# Patient Record
Sex: MALE | Race: WHITE | NOT HISPANIC OR LATINO | Employment: OTHER | URBAN - METROPOLITAN AREA
[De-identification: names, ages, dates, MRNs, and addresses within clinical notes are randomized per-mention and may not be internally consistent; named-entity substitution may affect disease eponyms.]

---

## 2017-01-17 ENCOUNTER — OFFICE VISIT (OUTPATIENT)
Dept: FAMILY MEDICINE | Facility: CLINIC | Age: 67
End: 2017-01-17
Payer: COMMERCIAL

## 2017-01-17 VITALS
TEMPERATURE: 99.2 F | WEIGHT: 214.5 LBS | DIASTOLIC BLOOD PRESSURE: 68 MMHG | HEART RATE: 84 BPM | HEIGHT: 71 IN | SYSTOLIC BLOOD PRESSURE: 110 MMHG | OXYGEN SATURATION: 97 % | BODY MASS INDEX: 30.03 KG/M2

## 2017-01-17 DIAGNOSIS — J01.10 ACUTE NON-RECURRENT FRONTAL SINUSITIS: Primary | ICD-10-CM

## 2017-01-17 DIAGNOSIS — F17.200 TOBACCO USE DISORDER: ICD-10-CM

## 2017-01-17 DIAGNOSIS — Z12.11 SCREEN FOR COLON CANCER: ICD-10-CM

## 2017-01-17 DIAGNOSIS — Z23 ENCOUNTER FOR IMMUNIZATION: ICD-10-CM

## 2017-01-17 PROCEDURE — 99214 OFFICE O/P EST MOD 30 MIN: CPT | Performed by: FAMILY MEDICINE

## 2017-01-17 RX ORDER — AMOXICILLIN 875 MG
875 TABLET ORAL 2 TIMES DAILY
Qty: 20 TABLET | Refills: 0 | Status: SHIPPED | OUTPATIENT
Start: 2017-01-17 | End: 2017-05-26

## 2017-01-17 NOTE — PROGRESS NOTES
SUBJECTIVE:                                                    Louie Osorio is a 66 year old male who presents to clinic today for the following health issues:      Acute Illness   Acute illness concerns: sinus   Onset: couple weeks ago  But last Saturday got worse    Fever: YES    Chills/Sweats: YES    Headache (location?): YES- frontal    Sinus Pressure:YES- post-nasal drainage and facial pain    Conjunctivitis:  no    Ear Pain: no    Rhinorrhea: YES    Congestion: no    Sore Throat: YES     Cough: no    Wheeze: no    Decreased Appetite: no    Nausea: no    Vomiting: no    Diarrhea:  no    Dysuria/Freq.: no    Fatigue/Achiness: YES- achiness     Sick/Strep Exposure: YES- friends are sick      Therapies Tried and outcome: nasal spray and cough drops     Couple of weeks of cold and for last one week - worse.   Feverish feeling with some chills.   Pain above eyes. Pain changes with body movement.     Smoking - not more than a pack.     Problem list and histories reviewed & adjusted, as indicated.  Additional history: as documented    Problem list, Medication list, Allergies, and Medical/Social/Surgical histories reviewed in [facility] and updated as appropriate.      Social History     Social History     Marital Status: Single     Spouse Name: N/A     Number of Children: 0     Years of Education: N/A     Occupational History      -       [employer]     Social History Main Topics     Smoking status: Current Some Day Smoker -- 42 years     Types: Cigarettes     Smokeless tobacco: Never Used      Comment: Smoker - <1 ppd started at 17 years     Alcohol Use: Yes      Comment: 15 drinks a week      Drug Use: No     Sexual Activity:     Partners: Female     Birth Control/ Protection: Abstinence     Other Topics Concern      Service No     Blood Transfusions No     Caffeine Concern No     Occupational Exposure Yes     Hobby Hazards No     Sleep Concern Yes     can't skeep well at  "night     Stress Concern No     Weight Concern Yes     pt is not happy with weight     Special Diet No     Back Care No     Exercise Yes     sometimes     Bike Helmet Yes     pt does ride a bike w/helmet     Seat Belt Yes     Self-Exams No     Parent/Sibling W/ Cabg, Mi Or Angioplasty Before 65f 55m? No     Social History Narrative     No Known Allergies  Patient Active Problem List   Diagnosis     Benign familial tremor     Tobacco use disorder     Reviewed medications, social history and  past medical and surgical history.    Review of system: for general, respiratory, CVS, GI and psychiatry negative except for noted above.     EXAM:  /68 mmHg  Pulse 84  Temp(Src) 99.2  F (37.3  C) (Oral)  Ht 5' 11\" (1.803 m)  Wt 214 lb 8 oz (97.297 kg)  BMI 29.93 kg/m2  SpO2 97%  Constitutional: healthy, alert and no distress   Psychiatric: mentation appears normal and affect normal/bright  Cardiovascular: RRR. No murmurs,  Respiratory: negative, Lungs clear. No crackles or wheezing. No tachypnea.   ENT: right canal- mild wax. Left TM clear. . Throat mild erythema. Frontal and mild maxillary sinus tenderness.   Neuro - coarse tremors,     ASSESSMENT / PLAN:  (J01.10) Acute non-recurrent frontal sinusitis  (primary encounter diagnosis)  Comment: discussed home care. antibiotics and side effects.   Plan: amoxicillin (AMOXIL) 875 MG tablet              (F17.200) Tobacco use disorder  Comment:  Can worsen problem #1.  Plan: discussed to quit it completely. He is going to work on his own. Does not want any other intervention for now.     (Z12.11) Screen for colon cancer  Comment:    Plan: GASTROENTEROLOGY ADULT REFERRAL +/- PROCEDURE                  "

## 2017-01-17 NOTE — NURSING NOTE
"Chief Complaint   Patient presents with     Sinus Problem       Initial /68 mmHg  Pulse 84  Temp(Src) 99.2  F (37.3  C) (Oral)  Ht 5' 11\" (1.803 m)  Wt 214 lb 8 oz (97.297 kg)  BMI 29.93 kg/m2  SpO2 97% Estimated body mass index is 29.93 kg/(m^2) as calculated from the following:    Height as of this encounter: 5' 11\" (1.803 m).    Weight as of this encounter: 214 lb 8 oz (97.297 kg).  BP completed using cuff size: paramjit Solis CMA      "

## 2017-05-26 ENCOUNTER — OFFICE VISIT (OUTPATIENT)
Dept: FAMILY MEDICINE | Facility: CLINIC | Age: 67
End: 2017-05-26
Payer: COMMERCIAL

## 2017-05-26 VITALS
TEMPERATURE: 99.1 F | BODY MASS INDEX: 31.4 KG/M2 | HEART RATE: 108 BPM | HEIGHT: 71 IN | OXYGEN SATURATION: 95 % | DIASTOLIC BLOOD PRESSURE: 66 MMHG | WEIGHT: 224.25 LBS | SYSTOLIC BLOOD PRESSURE: 128 MMHG

## 2017-05-26 DIAGNOSIS — Z23 NEED FOR PNEUMOCOCCAL VACCINATION: ICD-10-CM

## 2017-05-26 DIAGNOSIS — R05.9 COUGH: Primary | ICD-10-CM

## 2017-05-26 DIAGNOSIS — F17.200 TOBACCO USE DISORDER: ICD-10-CM

## 2017-05-26 PROCEDURE — 90670 PCV13 VACCINE IM: CPT | Performed by: FAMILY MEDICINE

## 2017-05-26 PROCEDURE — 99213 OFFICE O/P EST LOW 20 MIN: CPT | Mod: 25 | Performed by: FAMILY MEDICINE

## 2017-05-26 PROCEDURE — G0009 ADMIN PNEUMOCOCCAL VACCINE: HCPCS | Performed by: FAMILY MEDICINE

## 2017-05-26 RX ORDER — CODEINE PHOSPHATE AND GUAIFENESIN 10; 100 MG/5ML; MG/5ML
1 SOLUTION ORAL EVERY 6 HOURS PRN
Qty: 120 ML | Refills: 0 | Status: SHIPPED | OUTPATIENT
Start: 2017-05-26 | End: 2017-08-03

## 2017-05-26 NOTE — MR AVS SNAPSHOT
"              After Visit Summary   2017    Louie Osorio    MRN: [id number]           Patient Information     Date Of Birth          1950        Visit Information        Provider Department      2017 8:00 AM Estuardo Wilson MD [facility]        Today's Diagnoses     Cough    -  1    Tobacco use disorder        Need for pneumococcal vaccination           Follow-ups after your visit        Who to contact     If you have questions or need follow up information about today's clinic visit or your schedule please contact [facility] directly at [phone].  Normal or non-critical lab and imaging results will be communicated to you by [employer], letter or phone within 4 business days after the clinic has received the results. If you do not hear from us within 7 days, please contact the clinic through [employer] or phone. If you have a critical or abnormal lab result, we will notify you by phone as soon as possible.  Submit refill requests through [employer] or call your pharmacy and they will forward the refill request to us. Please allow 3 business days for your refill to be completed.          Additional Information About Your Visit        MyChart Information     [employer] lets you send messages to your doctor, view your test results, renew your prescriptions, schedule appointments and more. To sign up, go to [email or web address] . Click on \"Log in\" on the left side of the screen, which will take you to the Welcome page. Then click on \"Sign up Now\" on the right side of the page.     You will be asked to enter the access code listed below, as well as some personal information. Please follow the directions to create your username and password.     Your access code is: [email or web address]  Expires: 2017  9:28 AM     Your access code will  in 90 days. If you need help or a new code, please call your [facility] or [phone].        Care EveryWhere ID     This " "is your Care EveryWhere ID. This could be used by other organizations to access your [facility] medical records  [id number]        Your Vitals Were     Pulse Temperature Height Pulse Oximetry BMI (Body Mass Index)       108 99.1  F (37.3  C) (Oral) 5' 11\" (1.803 m) 95% 31.28 kg/m2        Blood Pressure from Last 3 Encounters:   05/26/17 128/66   01/17/17 110/68   05/09/16 124/86    Weight from Last 3 Encounters:   05/26/17 224 lb 4 oz (101.7 kg)   01/17/17 214 lb 8 oz (97.3 kg)   05/09/16 213 lb (96.6 kg)              We Performed the Following     ADMIN: Vaccine, Initial (34862)     Pneumococcal vaccine 13 valent PCV13 IM (Prevnar) [02202]          Today's Medication Changes          These changes are accurate as of: 5/26/17 11:59 PM.  If you have any questions, ask your nurse or doctor.               Start taking these medicines.        Dose/Directions    guaiFENesin-codeine 100-10 MG/5ML Soln solution   Commonly known as:  ROBITUSSIN AC   Used for:  Cough, Tobacco use disorder   Started by:  Estuardo Wilson MD        Dose:  1 tsp.   Take 5 mLs by mouth every 6 hours as needed   Quantity:  120 mL   Refills:  0            Where to get your medicines      Some of these will need a paper prescription and others can be bought over the counter.  Ask your nurse if you have questions.     Bring a paper prescription for each of these medications     guaiFENesin-codeine 100-10 MG/5ML Soln solution                Primary Care Provider Office Phone # Fax #    Estuardo Wilson [phone]       [address] [address]        Thank you!     Thank you for choosing [facility]  for your care. Our goal is always to provide you with excellent care. Hearing back from our patients is one way we can continue to improve our services. Please take a few minutes to complete the written survey that you may receive in the mail after your visit with " us. Thank you!             Your Updated Medication List - Protect others around you: Learn how to safely use, store and throw away your medicines at www.disposemymeds.org.          This list is accurate as of: 5/26/17 11:59 PM.  Always use your most recent med list.                   Brand Name Dispense Instructions for use    EQL FIBER SUPPLEMENT PO      Take 3 capsules by mouth daily as needed Psyllium, collinsonia powder, apple pectin, fennel, and fenugreek.  Uses once a week at the most for constipation.       fish oil-omega-3 fatty acids 1000 MG capsule      Take 2 g by mouth daily       fluticasone 50 MCG/ACT spray    FLONASE    16 g    Spray 2 sprays into both nostrils daily       Glucosamine Sulfate 750 MG Caps      Take 1 capsule by mouth daily       guaiFENesin-codeine 100-10 MG/5ML Soln solution    ROBITUSSIN AC    120 mL    Take 5 mLs by mouth every 6 hours as needed       * HERBALS      Take 1 each by mouth daily as needed (cold) Minus sinus includes - spearmint leafe, nettle leaf, mullein leaf, elderflowers, calendula, thyme, stevia. Only uses when he has a cold.       * HERBALS      Take 1 each by mouth daily as needed (doesnt drink every day) Sweet and spicy tea: rooibos, chicory root, rosehip, cinnamon, lemon grass, peppermint, chamomile, ginger root, anise seed, orange oil.       magnesium 250 MG tablet      Take 1 tablet by mouth daily       propranolol 20 MG tablet    INDERAL    180 tablet    Take 1 tablet (20 mg) by mouth 3 times daily as needed       T-RELIEF PAIN ARNICA + 12 Thpk      1 Application by * route daily as needed (back pain and pain in general) Includes aconitum, arnica, belladonna, bellis perennis, calendula, officinalis, chamomilla, echinacea, hamamelis, hypericum, millefolium, wesley graveolens, symphytum officinale       Vitamin D (Cholecalciferol) 1000 UNITS Tabs      Take 1,000 Units by mouth daily       * Notice:  This list has 2 medication(s) that are the same as other  medications prescribed for you. Read the directions carefully, and ask your doctor or other care provider to review them with you.

## 2017-05-26 NOTE — NURSING NOTE
"Chief Complaint   Patient presents with     URI       Initial /66 (Cuff Size: Adult Large)  Pulse 108  Temp 99.1  F (37.3  C) (Oral)  Ht 5' 11\" (1.803 m)  Wt 224 lb 4 oz (101.7 kg)  SpO2 95%  BMI 31.28 kg/m2 Estimated body mass index is 31.28 kg/(m^2) as calculated from the following:    Height as of this encounter: 5' 11\" (1.803 m).    Weight as of this encounter: 224 lb 4 oz (101.7 kg).  Medication Reconciliation: complete     Berna Solis, FLORENTINO      "

## 2017-05-26 NOTE — NURSING NOTE
Screening Questionnaire for Adult Immunization    Are you sick today?   No   Do you have allergies to medications, food, a vaccine component or latex?   No   Have you ever had a serious reaction after receiving a vaccination?   No   Do you have a long-term health problem with heart disease, lung disease, asthma, kidney disease, metabolic disease (e.g. diabetes), anemia, or other blood disorder?   No   Do you have cancer, leukemia, HIV/AIDS, or any other immune system problem?   No   In the past 3 months, have you taken medications that affect  your immune system, such as prednisone, other steroids, or anticancer drugs; drugs for the treatment of rheumatoid arthritis, Crohn s disease, or psoriasis; or have you had radiation treatments?   No   Have you had a seizure, or a brain or other nervous system problem?   No   During the past year, have you received a transfusion of blood or blood     products, or been given immune (gamma) globulin or antiviral drug?   No   For women: Are you pregnant or is there a chance you could become        pregnant during the next month?   No   Have you received any vaccinations in the past 4 weeks?   No     Immunization questionnaire answers were all negative.      MNVFC doesn't apply on this patient    Per orders of Dr. Wilson, injection of PCV 13 given by Berna Solis. Patient instructed to remain in clinic for 20 minutes afterwards, and to report any adverse reaction to me immediately.       Screening performed by Berna Solis on 5/26/2017 at 8:37 AM.    Prior to injection verified patient identity using patient's name and date of birth.

## 2017-05-26 NOTE — PROGRESS NOTES
SUBJECTIVE:                                                    Louie Osorio is a 66 year old male who presents to clinic today for the following health issues:    Acute Illness   Acute illness concerns: cold and cough  Onset: 3 days     Fever: no    Chills/Sweats: no    Headache (location?): YES- frontal     Sinus Pressure:YES- post-nasal drainage and facial pain    Conjunctivitis:  no    Ear Pain: no    Rhinorrhea: YES    Congestion: YES    Sore Throat: YES     Cough: YES-productive of green sputum    Wheeze: no    Decreased Appetite: no    Nausea: no    Vomiting: no    Diarrhea:  no    Dysuria/Freq.: no    Fatigue/Achiness: no    Sick/Strep Exposure: no     Therapies Tried and outcome: cough drops     Still smoking but cut down - little less than 1 pack per day.     Current symptoms started Wednesday night.     Coughing and throat pain are worst right now.     Problem list and histories reviewed & adjusted, as indicated.  Additional history: as documented    Labs reviewed in EPIC    Reviewed and updated as needed this visit by clinical staff    Reviewed and updated as needed this visit by Provider    Social History     Social History     Marital status: Single     Spouse name: N/A     Number of children: 0     Years of education: N/A     Occupational History      -  [employer]     Social History Main Topics     Smoking status: Current Some Day Smoker     Years: 42.00     Types: Cigarettes     Smokeless tobacco: Never Used      Comment: Smoker - <1 ppd started at 17 years     Alcohol use Yes      Comment: 15 drinks a week      Drug use: No     Sexual activity: Yes     Partners: Female     Birth control/ protection: Abstinence     Other Topics Concern      Service No     Blood Transfusions No     Caffeine Concern No     Occupational Exposure Yes     Hobby Hazards No     Sleep Concern Yes     can't skeep well at night     Stress Concern No      "Weight Concern Yes     pt is not happy with weight     Special Diet No     Back Care No     Exercise Yes     sometimes     Bike Helmet Yes     pt does ride a bike w/helmet     Seat Belt Yes     Self-Exams No     Parent/Sibling W/ Cabg, Mi Or Angioplasty Before 65f 55m? No     Social History Narrative     No Known Allergies  Patient Active Problem List   Diagnosis     Benign familial tremor     Tobacco use disorder     Reviewed medications, social history and  past medical and surgical history.    Review of system: for general, respiratory, CVS, GI and psychiatry negative except for noted above.     EXAM:  /66 (Cuff Size: Adult Large)  Pulse 108  Temp 99.1  F (37.3  C) (Oral)  Ht 5' 11\" (1.803 m)  Wt 224 lb 4 oz (101.7 kg)  SpO2 95%  BMI 31.28 kg/m2  Constitutional: healthy, alert and no distress   Psychiatric: mentation appears normal and affect normal/bright  Cardiovascular: RRR. No murmurs,  Respiratory: negative, Lungs clear. No crackles or wheezing. No tachypnea.   ENT: Both TM exam normal, no neck nodes or sinus tenderness, mild nasal turbinate hypertrophy, throat clear    ASSESSMENT / PLAN:  (R05) Cough  (primary encounter diagnosis)  Comment: recent symptoms. Most likely viral. cheratussin AC for cough suppression. We talked about short and long term side effects, dependency, abuse etc with current medication. Understands controlled substance and we do not refill controlled substance without seen, over phone or via email/mychart. If symptoms persist for another week, may consider antibiotics.   Plan: guaiFENesin-codeine (ROBITUSSIN AC) 100-10         MG/5ML SOLN solution    (F17.200) Tobacco use disorder  Comment:  Ongoing. Most likely contributing factor for above. Copd can not be ruled out. Strongly recommend him to quit smoking.   Plan: guaiFENesin-codeine (ROBITUSSIN AC) 100-10         MG/5ML SOLN solution    (Z23) Need for pneumococcal vaccination  Comment:    Plan: Pneumococcal vaccine 13 " valent PCV13 IM         (Prevnar) [03918], ADMIN: Vaccine, Initial         (94239)

## 2017-05-31 NOTE — PROGRESS NOTES
SUBJECTIVE:                                                    Louie Osorio is a 66 year old male who presents to clinic today for the following health issues:      RESPIRATORY SYMPTOMS      Duration: few weeks    Description  nasal congestion, productive cough and post-nasal drip    Severity: moderate    Accompanying signs and symptoms: None    History (predisposing factors):  Seen here last week, current smoker    Precipitating or alleviating factors: None    Therapies tried and outcome:  Codeine cough syrup, fluticasone nasal spray, Amoxicillin somewhat helpful       Evaluated for cough and URI symptoms x 3 days 5/26/17.  Symptoms felt to be viral, pt advised on symptomatic cares.  Hx smoking, possible underlying COPD noted on last visit.      Patient reports ongoing cough and postnasal drainage, rhinitis.  No fever.  No wheezing or shortness of breath.  Cough is productive, green.  Last week had some facial pressure which has resolved.  Does have some mild congestion.    Does have history of mild seasonal allergies.  Does not take allergy medication.    Smoking off and on for 50 years, currently smoking 1ppd, since last Thursday has only smoked 1 pack.  Tends to get cold once a year, denies history of recurrent episodes of cough, no history of chronic daily cough.  Quit smoking cold turkey for 10 years.    Patient Active Problem List   Diagnosis     Benign familial tremor     Tobacco use disorder     Past Surgical History:   Procedure Laterality Date     C ORAL SURGERY PROCEDURE  1967    [place] Teeth Extraction     C REMOVAL GALLBLADDER  1995       Social History   Substance Use Topics     Smoking status: Current Some Day Smoker     Years: 42.00     Types: Cigarettes     Smokeless tobacco: Never Used      Comment: Smoker - <1 ppd started at 17 years     Alcohol use Yes      Comment: 15 drinks a week      Family History   Problem Relation Age of Onset     Thyroid Disease Mother          Current Outpatient  Prescriptions   Medication Sig Dispense Refill     azithromycin (ZITHROMAX) 250 MG tablet Two tablets first day, then one tablet daily for four days. 6 tablet 0     fluticasone (FLONASE) 50 MCG/ACT spray Spray 1-2 sprays into both nostrils daily 1 Bottle 0     Vitamin D, Cholecalciferol, 1000 UNITS TABS Take 1,000 Units by mouth daily       fish oil-omega-3 fatty acids 1000 MG capsule Take 2 g by mouth daily       magnesium 250 MG tablet Take 1 tablet by mouth daily       Glucosamine Sulfate 750 MG CAPS Take 1 capsule by mouth daily       Corn Dextrin (EQL FIBER SUPPLEMENT PO) Take 3 capsules by mouth daily as needed Psyllium, collinsonia powder, apple pectin, fennel, and fenugreek.  Uses once a week at the most for constipation.       HERBALS Take 1 each by mouth daily as needed (cold) Minus sinus includes - spearmint leafe, nettle leaf, mullein leaf, elderflowers, calendula, thyme, stevia. Only uses when he has a cold.       HERBALS Take 1 each by mouth daily as needed (doesnt drink every day) Sweet and spicy tea: rooibos, chicory root, rosehip, cinnamon, lemon grass, peppermint, chamomile, ginger root, anise seed, orange oil.       Homeopathic Products (T-RELIEF PAIN ARNICA + 12) THPK 1 Application by * route daily as needed (back pain and pain in general) Includes aconitum, arnica, belladonna, bellis perennis, calendula, officinalis, chamomilla, echinacea, hamamelis, hypericum, millefolium, wesley graveolens, symphytum officinale       guaiFENesin-codeine (ROBITUSSIN AC) 100-10 MG/5ML SOLN solution Take 5 mLs by mouth every 6 hours as needed (Patient not taking: Reported on 6/1/2017) 120 mL 0     fluticasone (FLONASE) 50 MCG/ACT nasal spray Spray 2 sprays into both nostrils daily (Patient not taking: Reported on 5/26/2017) 16 g 0     propranolol (INDERAL) 20 MG tablet Take 1 tablet (20 mg) by mouth 3 times daily as needed (Patient not taking: Reported on 5/26/2017) 180 tablet 1       ROS:  Const, HEENT, Resp,  Psych as above, otherwise negative       OBJECTIVE:                                                    /84 (BP Location: Left arm, Patient Position: Chair, Cuff Size: Adult Large)  Pulse 86  Temp 98.9  F (37.2  C) (Tympanic)  Resp 16  Wt 225 lb (102.1 kg)  SpO2 98%  BMI 31.38 kg/m2   GENERAL APPEARANCE: healthy, alert and no distress  EYES: Eyes grossly normal to inspection and conjunctivae and sclerae normal  HENT: ear canals and TM's normal and nose and mouth without ulcers or lesions  NECK: no adenopathy  RESP: lungs clear to auscultation - no rales, rhonchi or wheezes  CV: regular rates and rhythm, normal S1 S2, no S3 or S4 and no murmur, click or rub  PSYCH: mentation appears normal and affect normal/bright        ASSESSMENT/PLAN:                                                    (R05) Cough  (primary encounter diagnosis)  Comment: viral v. Allergies v. COPD.  Stable O2 sats, afebrile, lungs CTA, doubt pneumonia.  PND seems to be contributing significantly   Plan: azithromycin (ZITHROMAX) 250 MG tablet        Trial of 1 week antihistamine and steroid nasal spray to dry up PND.  If not improving in 1 week feel rx for zpack.  Follow up if still not improving after abx.    (F17.200) Tobacco use disorder  Plan: urged cessation    (R09.82) Post-nasal drainage  Plan: fluticasone (FLONASE) 50 MCG/ACT spray        As above         See Patient Instructions    Kayla Espana, [facility]    Patient Instructions   1.  I think cough might be triggered by drainage down throat/allergies.  Start over the counter antihistamine once a day like allegra, claritin, or zytec.  Start flonase steroid nasal spray once a day.  If not improving in 1 week start antibiotics azithromycin, 2 pills on the first day then 1 pill per day for 4 more days.    2.  Work on quitting smoking, this is the best thing you can do for your health

## 2017-06-01 ENCOUNTER — OFFICE VISIT (OUTPATIENT)
Dept: FAMILY MEDICINE | Facility: CLINIC | Age: 67
End: 2017-06-01
Payer: COMMERCIAL

## 2017-06-01 VITALS
OXYGEN SATURATION: 98 % | RESPIRATION RATE: 16 BRPM | HEART RATE: 86 BPM | WEIGHT: 225 LBS | SYSTOLIC BLOOD PRESSURE: 128 MMHG | TEMPERATURE: 98.9 F | BODY MASS INDEX: 31.38 KG/M2 | DIASTOLIC BLOOD PRESSURE: 84 MMHG

## 2017-06-01 DIAGNOSIS — F17.200 TOBACCO USE DISORDER: ICD-10-CM

## 2017-06-01 DIAGNOSIS — R05.9 COUGH: Primary | ICD-10-CM

## 2017-06-01 DIAGNOSIS — R09.82 POST-NASAL DRAINAGE: ICD-10-CM

## 2017-06-01 PROCEDURE — 99213 OFFICE O/P EST LOW 20 MIN: CPT | Performed by: NURSE PRACTITIONER

## 2017-06-01 RX ORDER — AZITHROMYCIN 250 MG/1
TABLET, FILM COATED ORAL
Qty: 6 TABLET | Refills: 0 | Status: SHIPPED | OUTPATIENT
Start: 2017-06-01 | End: 2017-08-03

## 2017-06-01 RX ORDER — FLUTICASONE PROPIONATE 50 MCG
1-2 SPRAY, SUSPENSION (ML) NASAL DAILY
Qty: 1 BOTTLE | Refills: 0 | Status: SHIPPED | OUTPATIENT
Start: 2017-06-01

## 2017-06-01 NOTE — NURSING NOTE
"Chief Complaint   Patient presents with     Cough       Initial /84 (BP Location: Left arm, Patient Position: Chair, Cuff Size: Adult Large)  Pulse 86  Temp 98.9  F (37.2  C) (Tympanic)  Resp 16  Wt 225 lb (102.1 kg)  SpO2 98%  BMI 31.38 kg/m2 Estimated body mass index is 31.38 kg/(m^2) as calculated from the following:    Height as of 5/26/17: 5' 11\" (1.803 m).    Weight as of this encounter: 225 lb (102.1 kg).  Medication Reconciliation: complete     Danae Quiroz, CMA    "

## 2017-06-01 NOTE — PATIENT INSTRUCTIONS
1.  I think cough might be triggered by drainage down throat/allergies.  Start over the counter antihistamine once a day like allegra, claritin, or zytec.  Start flonase steroid nasal spray once a day.  If not improving in 1 week start antibiotics azithromycin, 2 pills on the first day then 1 pill per day for 4 more days.    2.  Work on quitting smoking, this is the best thing you can do for your health

## 2017-06-01 NOTE — MR AVS SNAPSHOT
"              After Visit Summary   6/1/2017    Louie Osorio    MRN: [id number]           Patient Information     Date Of Birth          1950        Visit Information        Provider Department      6/1/2017 12:00 PM Kayla Espana APRN CNP [facility]        Today's Diagnoses     Cough    -  1    Tobacco use disorder        Post-nasal drainage          Care Instructions    1.  I think cough might be triggered by drainage down throat/allergies.  Start over the counter antihistamine once a day like allegra, claritin, or zytec.  Start flonase steroid nasal spray once a day.  If not improving in 1 week start antibiotics azithromycin, 2 pills on the first day then 1 pill per day for 4 more days.    2.  Work on quitting smoking, this is the best thing you can do for your health          Follow-ups after your visit        Who to contact     If you have questions or need follow up information about today's clinic visit or your schedule please contact [facility] directly at [phone].  Normal or non-critical lab and imaging results will be communicated to you by Penumbrahart, letter or phone within 4 business days after the clinic has received the results. If you do not hear from us within 7 days, please contact the clinic through Mob Sciencet or phone. If you have a critical or abnormal lab result, we will notify you by phone as soon as possible.  Submit refill requests through Dashbid or call your pharmacy and they will forward the refill request to us. Please allow 3 business days for your refill to be completed.          Additional Information About Your Visit        PenumbraharJoMaJa Information     Dashbid lets you send messages to your doctor, view your test results, renew your prescriptions, schedule appointments and more. To sign up, go to [email or web address] . Click on \"Log in\" on the left side of the screen, which will take you to the Welcome page. Then click on \"Sign up Now\" " on the right side of the page.     You will be asked to enter the access code listed below, as well as some personal information. Please follow the directions to create your username and password.     Your access code is: [email or web address]  Expires: 2017  9:28 AM     Your access code will  in 90 days. If you need help or a new code, please call your [place] clinic or [phone].        Care EveryWhere ID     This is your Care EveryWhere ID. This could be used by other organizations to access your [place] medical records  [id number]        Your Vitals Were     Pulse Temperature Respirations Pulse Oximetry BMI (Body Mass Index)       86 98.9  F (37.2  C) (Tympanic) 16 98% 31.38 kg/m2        Blood Pressure from Last 3 Encounters:   17 128/84   17 128/66   17 110/68    Weight from Last 3 Encounters:   17 225 lb (102.1 kg)   17 224 lb 4 oz (101.7 kg)   17 214 lb 8 oz (97.3 kg)              Today, you had the following     No orders found for display         Today's Medication Changes          These changes are accurate as of: 17 12:19 PM.  If you have any questions, ask your nurse or doctor.               Start taking these medicines.        Dose/Directions    azithromycin 250 MG tablet   Commonly known as:  ZITHROMAX   Used for:  Cough   Started by:  Kayla Espana APRN CNP        Two tablets first day, then one tablet daily for four days.   Quantity:  6 tablet   Refills:  0         These medicines have changed or have updated prescriptions.        Dose/Directions    * fluticasone 50 MCG/ACT spray   Commonly known as:  FLONASE   This may have changed:  Another medication with the same name was added. Make sure you understand how and when to take each.   Used for:  Acute sinusitis, recurrence not specified, unspecified location   Changed by:  Jacquie Pizarro MD        Dose:  2 spray   Spray 2 sprays into both nostrils daily   Quantity:  16 g   Refills:  0        * fluticasone 50 MCG/ACT spray   Commonly known as:  FLONASE   This may have changed:  You were already taking a medication with the same name, and this prescription was added. Make sure you understand how and when to take each.   Used for:  Post-nasal drainage   Changed by:  Kayla Espana APRN CNP        Dose:  1-2 spray   Spray 1-2 sprays into both nostrils daily   Quantity:  1 Bottle   Refills:  0       * Notice:  This list has 2 medication(s) that are the same as other medications prescribed for you. Read the directions carefully, and ask your doctor or other care provider to review them with you.         Where to get your medicines      These medications were sent to [facility] [place] [address] [place] [id number]     Phone:  [phone]     fluticasone 50 MCG/ACT spray         Some of these will need a paper prescription and others can be bought over the counter.  Ask your nurse if you have questions.     Bring a paper prescription for each of these medications     azithromycin 250 MG tablet                Primary Care Provider Office Phone # Fax #    Estuardo Inder Wilson [phone]       [address] [address]        Thank you!     Thank you for choosing [facility]  for your care. Our goal is always to provide you with excellent care. Hearing back from our patients is one way we can continue to improve our services. Please take a few minutes to complete the written survey that you may receive in the mail after your visit with us. Thank you!             Your Updated Medication List - Protect others around you: Learn how to safely use, store and throw away your medicines at www.disposemymeds.org.          This list is accurate as of: 6/1/17 12:19 PM.  Always use your most recent med list.                   Brand Name Dispense Instructions for use    azithromycin  250 MG tablet    ZITHROMAX    6 tablet    Two tablets first day, then one tablet daily for four days.       EQL FIBER SUPPLEMENT PO      Take 3 capsules by mouth daily as needed Psyllium, collinsonia powder, apple pectin, fennel, and fenugreek.  Uses once a week at the most for constipation.       fish oil-omega-3 fatty acids 1000 MG capsule      Take 2 g by mouth daily       * fluticasone 50 MCG/ACT spray    FLONASE    16 g    Spray 2 sprays into both nostrils daily       * fluticasone 50 MCG/ACT spray    FLONASE    1 Bottle    Spray 1-2 sprays into both nostrils daily       Glucosamine Sulfate 750 MG Caps      Take 1 capsule by mouth daily       guaiFENesin-codeine 100-10 MG/5ML Soln solution    ROBITUSSIN AC    120 mL    Take 5 mLs by mouth every 6 hours as needed       * HERBALS      Take 1 each by mouth daily as needed (cold) Minus sinus includes - spearmint leafe, nettle leaf, mullein leaf, elderflowers, calendula, thyme, stevia. Only uses when he has a cold.       * HERBALS      Take 1 each by mouth daily as needed (doesnt drink every day) Sweet and spicy tea: rooibos, chicory root, rosehip, cinnamon, lemon grass, peppermint, chamomile, ginger root, anise seed, orange oil.       magnesium 250 MG tablet      Take 1 tablet by mouth daily       propranolol 20 MG tablet    INDERAL    180 tablet    Take 1 tablet (20 mg) by mouth 3 times daily as needed       T-RELIEF PAIN ARNICA + 12 Thpk      1 Application by * route daily as needed (back pain and pain in general) Includes aconitum, arnica, belladonna, bellis perennis, calendula, officinalis, chamomilla, echinacea, hamamelis, hypericum, millefolium, wesley graveolens, symphytum officinale       Vitamin D (Cholecalciferol) 1000 UNITS Tabs      Take 1,000 Units by mouth daily       * Notice:  This list has 4 medication(s) that are the same as other medications prescribed for you. Read the directions carefully, and ask your doctor or other care provider to review them  with you.

## 2017-07-21 ENCOUNTER — DOCUMENTATION ONLY (OUTPATIENT)
Dept: VASCULAR SURGERY | Facility: CLINIC | Age: 67
End: 2017-07-21

## 2017-07-21 DIAGNOSIS — Z13.6 ENCOUNTER FOR ABDOMINAL AORTIC ANEURYSM (AAA) SCREENING: Primary | ICD-10-CM

## 2017-07-21 DIAGNOSIS — Z72.0 CURRENT TOBACCO USE: Primary | ICD-10-CM

## 2017-08-03 ENCOUNTER — OFFICE VISIT (OUTPATIENT)
Dept: FAMILY MEDICINE | Facility: CLINIC | Age: 67
End: 2017-08-03
Payer: COMMERCIAL

## 2017-08-03 VITALS
SYSTOLIC BLOOD PRESSURE: 136 MMHG | TEMPERATURE: 97.9 F | WEIGHT: 224 LBS | BODY MASS INDEX: 31.24 KG/M2 | RESPIRATION RATE: 18 BRPM | HEART RATE: 93 BPM | DIASTOLIC BLOOD PRESSURE: 89 MMHG | OXYGEN SATURATION: 95 %

## 2017-08-03 DIAGNOSIS — H00.011 HORDEOLUM EXTERNUM OF RIGHT UPPER EYELID: Primary | ICD-10-CM

## 2017-08-03 PROCEDURE — 99213 OFFICE O/P EST LOW 20 MIN: CPT | Performed by: FAMILY MEDICINE

## 2017-08-03 RX ORDER — POLYMYXIN B SULFATE AND TRIMETHOPRIM 1; 10000 MG/ML; [USP'U]/ML
1 SOLUTION OPHTHALMIC
Qty: 1 BOTTLE | Refills: 0 | Status: SHIPPED | OUTPATIENT
Start: 2017-08-03 | End: 2017-08-10

## 2017-08-03 NOTE — MR AVS SNAPSHOT
After Visit Summary   8/3/2017    Louie Osorio    MRN: [id number]           Patient Information     Date Of Birth          1950        Visit Information        Provider Department      8/3/2017 10:20 AM Katharine Curry MD [facility]        Today's Diagnoses     Hordeolum externum of right upper eyelid    -  1      Care Instructions      Sty (or Stye)  A sty is an infection of the oil gland of the eyelid. It may develop into a small pocket of pus (abscess). This can cause pain, redness, and swelling. In early stages, styes are treated with antibiotic cream, eye drops, or warm packs (small towels soaked in warm water). More severe cases may need to be opened and drained by a health care provider.  Home care    Eye drops or ointment are usually prescribed to treat the infection. Use these as directed.     Artificial tears may also be used to lubricate the eye and make it more comfortable. These may be purchased without a prescription.     Talk to your health care provider before using any over-the-counter treatment for a sty.    Apply a warm, damp towel to the affected eye for at least 5 minutes, 3 to 4 times a day for a week. Warm compresses open the pores and speed the healing. If the compresses are too hot, they may burn your eyelid.    Sometimes the sty will drain with this treatment alone. If this happens, continue the antibiotic until all the redness and swelling are gone.    Wash your hands before and after touching the infected eye to avoid spreading the infection.    Do not squeeze or try to puncture the sty.  Follow-up care  Follow up with your health care provider, or as advised.   When to seek medical advice  Call your health care provider right away if you have:    Increase in swelling or redness around the eyelid after 48 to 72 hours    Increase in eye pain or the eyelid blisters    Increase in warmth--the eyelid feels hot    Drainage of blood or thick pus from the  "sty    Blister on the eyelid    Inability to open the eyelid due to swelling    Fever    1 degree above your normal temperature lasting for 24 to 48 hours, or    Whatever your health care provider told you to report based on your medical condition    Vision changes    Headache or stiff neck    Recurrence of the sty  Date Last Reviewed: 6/14/2015 2000-2017 [employer]. [address] [place] [address]. All rights reserved. This information is not intended as a substitute for professional medical care. Always follow your healthcare professional's instructions.                Follow-ups after your visit        Who to contact     If you have questions or need follow up information about today's clinic visit or your schedule please contact [facility] directly at [phone].  Normal or non-critical lab and imaging results will be communicated to you by MyChart, letter or phone within 4 business days after the clinic has received the results. If you do not hear from us within 7 days, please contact the clinic through Impact Radiushart or phone. If you have a critical or abnormal lab result, we will notify you by phone as soon as possible.  Submit refill requests through Wego or call your pharmacy and they will forward the refill request to us. Please allow 3 business days for your refill to be completed.          Additional Information About Your Visit        Impact Radiushardoggyloot Information     Wego lets you send messages to your doctor, view your test results, renew your prescriptions, schedule appointments and more. To sign up, go to [email or web address] . Click on \"Log in\" on the left side of the screen, which will take you to the Welcome page. Then click on \"Sign up Now\" on the right side of the page.     You will be asked to enter the access code listed below, as well as some personal information. Please follow the directions to create your username and password.     Your access code is: " [email or web address]  Expires: 2017  9:28 AM     Your access code will  in 90 days. If you need help or a new code, please call your [facility] clinic or [phone].        Care EveryWhere ID     This is your Care EveryWhere ID. This could be used by other organizations to access your [facility] medical records  [id number]        Your Vitals Were     Pulse Temperature Respirations Pulse Oximetry BMI (Body Mass Index)       93 97.9  F (36.6  C) (Oral) 18 95% 31.24 kg/m2        Blood Pressure from Last 3 Encounters:   17 136/89   17 128/84   17 128/66    Weight from Last 3 Encounters:   17 224 lb (101.6 kg)   17 225 lb (102.1 kg)   17 224 lb 4 oz (101.7 kg)              Today, you had the following     No orders found for display         Today's Medication Changes          These changes are accurate as of: 8/3/17 10:52 AM.  If you have any questions, ask your nurse or doctor.               Start taking these medicines.        Dose/Directions    trimethoprim-polymyxin b ophthalmic solution   Commonly known as:  POLYTRIM   Used for:  Hordeolum externum of right upper eyelid   Started by:  Katharine Curry MD        Dose:  1 drop   Place 1 drop into the right eye every 3 hours for 7 days   Quantity:  1 Bottle   Refills:  0            Where to get your medicines      These medications were sent to [facility] [place] [address]  [facility] [address] [place] [id number]     Phone:  [phone]     trimethoprim-polymyxin b ophthalmic solution                Primary Care Provider Office Phone # Fax #    Estuardo Inder Wilson [phone]       [place] [id number]        Equal Access to Services     SWATHI VALENCIA AH: Greg Abreu, wamattie lureza, qaluz maria kamaryana avalos, los marrufo. So [facility] [phone].    ATENCIÓN: Si noel hu  disposición servicios gratuitos de asistencia lingüística. Jocelyn dave [phone].    We comply with applicable federal civil rights laws and [place] laws. We do not discriminate on the basis of race, color, national origin, age, disability sex, sexual orientation or gender identity.            Thank you!     Thank you for choosing [facility]  for your care. Our goal is always to provide you with excellent care. Hearing back from our patients is one way we can continue to improve our services. Please take a few minutes to complete the written survey that you may receive in the mail after your visit with us. Thank you!             Your Updated Medication List - Protect others around you: Learn how to safely use, store and throw away your medicines at www.disposemymeds.org.          This list is accurate as of: 8/3/17 10:52 AM.  Always use your most recent med list.                   Brand Name Dispense Instructions for use Diagnosis    EQL FIBER SUPPLEMENT PO      Take 3 capsules by mouth daily as needed Psyllium, collinsonia powder, apple pectin, fennel, and fenugreek.  Uses once a week at the most for constipation.        fish oil-omega-3 fatty acids 1000 MG capsule      Take 2 g by mouth daily        fluticasone 50 MCG/ACT spray    FLONASE    1 Bottle    Spray 1-2 sprays into both nostrils daily    Post-nasal drainage       Glucosamine Sulfate 750 MG Caps      Take 1 capsule by mouth daily        * HERBALS      Take 1 each by mouth daily as needed (cold) Minus sinus includes - spearmint leafe, nettle leaf, mullein leaf, elderflowers, calendula, thyme, stevia. Only uses when he has a cold.        * HERBALS      Take 1 each by mouth daily as needed (doesnt drink every day) Sweet and spicy tea: rooibos, chicory root, rosehip, cinnamon, lemon grass, peppermint, chamomile, ginger root, anise seed, orange oil.        magnesium 250 MG tablet      Take 1 tablet by mouth daily        propranolol 20 MG  tablet    INDERAL    180 tablet    Take 1 tablet (20 mg) by mouth 3 times daily as needed    Benign familial tremor       T-RELIEF PAIN ARNICA + 12 Thpk      1 Application by * route daily as needed (back pain and pain in general) Includes aconitum, arnica, belladonna, bellis perennis, calendula, officinalis, chamomilla, echinacea, hamamelis, hypericum, millefolium, wesley graveolens, symphytum officinale        trimethoprim-polymyxin b ophthalmic solution    POLYTRIM    1 Bottle    Place 1 drop into the right eye every 3 hours for 7 days    Hordeolum externum of right upper eyelid       Vitamin D (Cholecalciferol) 1000 UNITS Tabs      Take 1,000 Units by mouth daily        * Notice:  This list has 2 medication(s) that are the same as other medications prescribed for you. Read the directions carefully, and ask your doctor or other care provider to review them with you.

## 2017-08-03 NOTE — PROGRESS NOTES
"  SUBJECTIVE:                                                    Louie Osorio is a 66 year old male who presents to clinic today for the following health issues:    Eye(s) Problem  Onset: 3 days ago (Tuesday started bothering him)    Description:   Location: right  Pain: YES- a little discomfort of the eyelid  Redness: YES    Accompanying Signs & Symptoms:  Discharge/mattering: YES  Swelling: no  Visual changes: no  Fever: no  Nasal Congestion: no  Bothered by bright lights: no    History:   Trauma: no   Foreign body exposure: no    Precipitating factors:   Wearing contacts: no    Alleviating factors:  Improved by: none    Therapies Tried and outcome: none      VISION   Wears glasses: worn for testing  Tool used: [employer]air   Right eye:        10/25 (20/50)     Left eye:          10/16 (20/32)   Visual Acuity: Pass        Occasionally his right eye is gooey and crusted over. The top eyelid is slightly inflamed.   Denies injury or trauma to his eye. Vision gets \"goofy\" once and awhile, but blinking makes his vision more clear.  Denies itch or pain. Denies pain with eye movement.       Problem list and histories reviewed & adjusted, as indicated.  Additional history: as documented  Patient Active Problem List   Diagnosis     Benign familial tremor     Tobacco use disorder     Past Surgical History:   Procedure Laterality Date     C ORAL SURGERY PROCEDURE  1967    [place] Teeth Extraction     HC REMOVAL GALLBLADDER  1995       Social History   Substance Use Topics     Smoking status: Current Some Day Smoker     Years: 42.00     Types: Cigarettes     Smokeless tobacco: Never Used      Comment: Smoker - <1 ppd started at 17 years     Alcohol use Yes      Comment: 15 drinks a week      Family History   Problem Relation Age of Onset     Thyroid Disease Mother          Current Outpatient Prescriptions   Medication Sig Dispense Refill     trimethoprim-polymyxin b (POLYTRIM) ophthalmic solution Place 1 drop into the right eye " every 3 hours for 7 days 1 Bottle 0     fluticasone (FLONASE) 50 MCG/ACT spray Spray 1-2 sprays into both nostrils daily 1 Bottle 0     Vitamin D, Cholecalciferol, 1000 UNITS TABS Take 1,000 Units by mouth daily       fish oil-omega-3 fatty acids 1000 MG capsule Take 2 g by mouth daily       magnesium 250 MG tablet Take 1 tablet by mouth daily       Glucosamine Sulfate 750 MG CAPS Take 1 capsule by mouth daily       Corn Dextrin (EQL FIBER SUPPLEMENT PO) Take 3 capsules by mouth daily as needed Psyllium, collinsonia powder, apple pectin, fennel, and fenugreek.  Uses once a week at the most for constipation.       HERBALS Take 1 each by mouth daily as needed (cold) Minus sinus includes - spearmint leafe, nettle leaf, mullein leaf, elderflowers, calendula, thyme, stevia. Only uses when he has a cold.       HERBALS Take 1 each by mouth daily as needed (doesnt drink every day) Sweet and spicy tea: rooibos, chicory root, rosehip, cinnamon, lemon grass, peppermint, chamomile, ginger root, anise seed, orange oil.       Homeopathic Products (T-RELIEF PAIN ARNICA + 12) THPK 1 Application by * route daily as needed (back pain and pain in general) Includes aconitum, arnica, belladonna, bellis perennis, calendula, officinalis, chamomilla, echinacea, hamamelis, hypericum, millefolium, wesley graveolens, symphytum officinale       propranolol (INDERAL) 20 MG tablet Take 1 tablet (20 mg) by mouth 3 times daily as needed 180 tablet 1     No Known Allergies  Recent Labs   Lab Test  08/28/15   1115   LDL  83   HDL  49   TRIG  59      BP Readings from Last 3 Encounters:   08/03/17 136/89   06/01/17 128/84   05/26/17 128/66    Wt Readings from Last 3 Encounters:   08/03/17 101.6 kg (224 lb)   06/01/17 102.1 kg (225 lb)   05/26/17 101.7 kg (224 lb 4 oz)        Reviewed and updated as needed this visit by clinical staff  Reviewed and updated as needed this visit by Provider    ROS:  See above.    This document serves as a record of the  services and decisions personally performed and made by Katharine Curry MD. It was created on his/her behalf by Patito Lagunas, trained medical scribe. The creation of this document is based the provider's statements to the medical scribes.    Yolandaibliz Lagunas, August 3, 2017  OBJECTIVE:     /89  Pulse 93  Temp 97.9  F (36.6  C) (Oral)  Resp 18  Wt 101.6 kg (224 lb)  SpO2 95%  BMI 31.24 kg/m2  Body mass index is 31.24 kg/(m^2).  GENERAL: healthy, alert and no distress   EYES: Right eye with slight erythema and swelling right upper eyelid margin with slight crust at lid and in corner of his eye. Eyes grossly normal to inspection, PERRL and conjunctivae and sclerae normal     Diagnostic Test Results:  none     ASSESSMENT/PLAN:   1. Hordeolum externum of right upper eyelid  He will start polytrim drops. I reviewed pt instructions with pt, warm compresses, hand washing, when to seek urgent care  - trimethoprim-polymyxin b (POLYTRIM) ophthalmic solution; Place 1 drop into the right eye every 3 hours for 7 days  Dispense: 1 Bottle; Refill: 0      Patient Instructions     Sty (or Stye)  A sty is an infection of the oil gland of the eyelid. It may develop into a small pocket of pus (abscess). This can cause pain, redness, and swelling. In early stages, styes are treated with antibiotic cream, eye drops, or warm packs (small towels soaked in warm water). More severe cases may need to be opened and drained by a health care provider.  Home care    Eye drops or ointment are usually prescribed to treat the infection. Use these as directed.     Artificial tears may also be used to lubricate the eye and make it more comfortable. These may be purchased without a prescription.     Talk to your health care provider before using any over-the-counter treatment for a sty.    Apply a warm, damp towel to the affected eye for at least 5 minutes, 3 to 4 times a day for a week. Warm compresses open the pores and speed the  healing. If the compresses are too hot, they may burn your eyelid.    Sometimes the sty will drain with this treatment alone. If this happens, continue the antibiotic until all the redness and swelling are gone.    Wash your hands before and after touching the infected eye to avoid spreading the infection.    Do not squeeze or try to puncture the sty.  Follow-up care  Follow up with your health care provider, or as advised.   When to seek medical advice  Call your health care provider right away if you have:    Increase in swelling or redness around the eyelid after 48 to 72 hours    Increase in eye pain or the eyelid blisters    Increase in warmth--the eyelid feels hot    Drainage of blood or thick pus from the sty    Blister on the eyelid    Inability to open the eyelid due to swelling    Fever    1 degree above your normal temperature lasting for 24 to 48 hours, or    Whatever your health care provider told you to report based on your medical condition    Vision changes    Headache or stiff neck    Recurrence of the sty  Date Last Reviewed: 6/14/2015 2000-2017 [employer]. [address] [place] [address]. All rights reserved. This information is not intended as a substitute for professional medical care. Always follow your healthcare professional's instructions.            The information in this document, created by the medical scribe for me, accurately reflects the services I personally performed and the decisions made by me. I have reviewed and approved this document for accuracy. 08/03/17    Katharine Curry MD  [facility]

## 2017-08-03 NOTE — NURSING NOTE
"Chief Complaint   Patient presents with     Eye Problem       Initial /89  Pulse 93  Temp 97.9  F (36.6  C) (Oral)  Resp 18  Wt 224 lb (101.6 kg)  SpO2 95%  BMI 31.24 kg/m2 Estimated body mass index is 31.24 kg/(m^2) as calculated from the following:    Height as of 5/26/17: 5' 11\" (1.803 m).    Weight as of this encounter: 224 lb (101.6 kg).  Medication Reconciliation: complete       Crystal Mccollum MA     "

## 2017-08-03 NOTE — PATIENT INSTRUCTIONS
Sty (or Stye)  A sty is an infection of the oil gland of the eyelid. It may develop into a small pocket of pus (abscess). This can cause pain, redness, and swelling. In early stages, styes are treated with antibiotic cream, eye drops, or warm packs (small towels soaked in warm water). More severe cases may need to be opened and drained by a health care provider.  Home care    Eye drops or ointment are usually prescribed to treat the infection. Use these as directed.     Artificial tears may also be used to lubricate the eye and make it more comfortable. These may be purchased without a prescription.     Talk to your health care provider before using any over-the-counter treatment for a sty.    Apply a warm, damp towel to the affected eye for at least 5 minutes, 3 to 4 times a day for a week. Warm compresses open the pores and speed the healing. If the compresses are too hot, they may burn your eyelid.    Sometimes the sty will drain with this treatment alone. If this happens, continue the antibiotic until all the redness and swelling are gone.    Wash your hands before and after touching the infected eye to avoid spreading the infection.    Do not squeeze or try to puncture the sty.  Follow-up care  Follow up with your health care provider, or as advised.   When to seek medical advice  Call your health care provider right away if you have:    Increase in swelling or redness around the eyelid after 48 to 72 hours    Increase in eye pain or the eyelid blisters    Increase in warmth--the eyelid feels hot    Drainage of blood or thick pus from the sty    Blister on the eyelid    Inability to open the eyelid due to swelling    Fever    1 degree above your normal temperature lasting for 24 to 48 hours, or    Whatever your health care provider told you to report based on your medical condition    Vision changes    Headache or stiff neck    Recurrence of the sty  Date Last Reviewed: 6/14/2015 2000-2017 [employer]. [address] [place] [address], [place] [address]. All rights reserved. This information is not intended as a substitute for professional medical care. Always follow your healthcare professional's instructions.

## 2017-10-12 ENCOUNTER — OFFICE VISIT (OUTPATIENT)
Dept: OPHTHALMOLOGY | Facility: CLINIC | Age: 67
End: 2017-10-12
Attending: OPHTHALMOLOGY
Payer: MEDICARE

## 2017-10-12 DIAGNOSIS — G25.0 BENIGN FAMILIAL TREMOR: ICD-10-CM

## 2017-10-12 DIAGNOSIS — H43.811 POSTERIOR VITREOUS DETACHMENT, RIGHT: Primary | ICD-10-CM

## 2017-10-12 PROCEDURE — 99212 OFFICE O/P EST SF 10 MIN: CPT | Mod: ZF

## 2017-10-12 ASSESSMENT — VISUAL ACUITY
OD_CC+: -2
METHOD: SNELLEN - LINEAR
OD_CC: 20/20
CORRECTION_TYPE: GLASSES
OS_CC: 20/20
OS_CC+: -1

## 2017-10-12 ASSESSMENT — TONOMETRY
OD_IOP_MMHG: 12
IOP_METHOD: ICARE
OS_IOP_MMHG: 15

## 2017-10-12 ASSESSMENT — EXTERNAL EXAM - RIGHT EYE: OD_EXAM: ESSENTIAL TREMOR

## 2017-10-12 ASSESSMENT — SLIT LAMP EXAM - LIDS
COMMENTS: NORMAL
COMMENTS: NORMAL

## 2017-10-12 ASSESSMENT — EXTERNAL EXAM - LEFT EYE: OS_EXAM: ESSENTIAL TREMOR

## 2017-10-12 ASSESSMENT — CUP TO DISC RATIO
OS_RATIO: 0.2
OD_RATIO: 0.2

## 2017-10-12 ASSESSMENT — CONF VISUAL FIELD
OS_NORMAL: 1
OD_NORMAL: 1
METHOD: COUNTING FINGERS

## 2017-10-12 NOTE — PROGRESS NOTES
Assessment & Plan      Louie Osorio is a 66 year old male with the following diagnoses:   1. Posterior vitreous detachment, right    2. Benign familial tremor       Healthy dilated fundus exam today   Discussed symptoms of retinal tear/detachment and the need to be seen urgently should they occur       Patient disposition:   Return in about 4 weeks (around 11/9/2017) for DFE RIGHT.          Attending Physician Attestation:  Complete documentation of historical and exam elements from today's encounter can be found in the full encounter summary report (not reduplicated in this progress note).  I personally obtained the chief complaint(s) and history of present illness.  I confirmed and edited as necessary the review of systems, past medical/surgical history, family history, social history, and examination findings as documented by others; and I examined the patient myself.  I personally reviewed the relevant tests, images, and reports as documented above.  I formulated and edited as necessary the assessment and plan and discussed the findings and management plan with the patient and family. . - Raphael Roberson MD

## 2017-10-12 NOTE — NURSING NOTE
"Chief Complaints and History of Present Illnesses   Patient presents with     Floaters Right Eye     HPI    Affected eye(s):  Right   Symptoms:     Blurred vision (Comment: blurred VA in the RE)   Floaters (Comment: moves with vision, many small black dots, pattern changes looks like a hair per pt x 2-3 weeks)   Flashes (Comment: once in a while a small flash in peripheral vision of the RE)      Duration:  2 weeks      Do you have eye pain now?:  No      Comments:    POH: no sx, injuries, or lasers  Fhx: Gma was \"blind\"  Mhx: tremors    Jaimee Gann October 12, 2017 9:06 AM               "

## 2017-10-12 NOTE — MR AVS SNAPSHOT
After Visit Summary   10/12/2017    Louie Osorio    MRN: [id number]           Patient Information     Date Of Birth          1950        Visit Information        Provider Department      10/12/2017 9:00 AM Rap[facility] Roberson MD [facility]        Today's Diagnoses     Posterior vitreous detachment, right    -  1    Benign familial tremor           Follow-ups after your visit        Follow-up notes from your care team     Return in about 4 weeks (around 2017) for DFE RIGHT.      Your next 10 appointments already scheduled     2017  9:15 AM CST   RETURN GENERAL with Raphael Roberson MD   Eye Clinic ([facility])    [address]  [address]  [facility] [place] [address] [phone]              Who to contact     Please call your clinic at [phone] to:    Ask questions about your health    Make or cancel appointments    Discuss your medicines    Learn about your test results    Speak to your doctor   If you have compliments or concerns about an experience at your clinic, or if you wish to file a complaint, please contact [facility] Patient Relations at [phone] or email us at [email or web address]         Additional Information About Your Visit        MyChart Information     datapinehart is an electronic gateway that provides easy, online access to your medical records. With [employer], you can request a clinic appointment, read your test results, renew a prescription or communicate with your care team.     To sign up for [employer] visit the website at [email or web address]   You will be asked to enter the access code listed below, as well as some personal information. Please follow the directions to create your username and password.     Your access code is: [email or web address]  Expires: 2018  6:31 AM     Your access code will  in 90 days. If you need help or a new code, please contact your  [facility] or call [phone] for assistance.        Care EveryWhere ID     This is your Care EveryWhere ID. This could be used by other organizations to access your [facility] medical records  [id number]         Blood Pressure from Last 3 Encounters:   08/03/17 136/89   06/01/17 128/84   05/26/17 128/66    Weight from Last 3 Encounters:   08/03/17 101.6 kg (224 lb)   06/01/17 102.1 kg (225 lb)   05/26/17 101.7 kg (224 lb 4 oz)              Today, you had the following     No orders found for display       Primary Care Provider Office Phone # Fax #    Estuardo Inder Wilson [phone] [address] [place] [address]        Equal Access to Services     SWATHI VALENCIA : Hadflako reno Sowillard, waaxda luqadaha, qaybta kaalmada bailey, los watson . So [facility] [phone].    ATENCIÓN: Si habla español, tiene a moore disposición servicios gratuitos de asistencia lingüística. Jocelyn al [phone].    We comply with applicable federal civil rights laws and [place] laws. We do not discriminate on the basis of race, color, national origin, age, disability, sex, sexual orientation, or gender identity.            Thank you!     Thank you for choosing [facility]  for your care. Our goal is always to provide you with excellent care. Hearing back from our patients is one way we can continue to improve our services. Please take a few minutes to complete the written survey that you may receive in the mail after your visit with us. Thank you!             Your Updated Medication List - Protect others around you: Learn how to safely use, store and throw away your medicines at www.disposemymeds.org.          This list is accurate as of: 10/12/17 10:02 AM.  Always use your most recent med list.                   Brand Name Dispense Instructions for use Diagnosis    EQL FIBER SUPPLEMENT PO      Take 3 capsules by mouth daily as needed  Psyllium, collinsonia powder, apple pectin, fennel, and fenugreek.  Uses once a week at the most for constipation.        fish oil-omega-3 fatty acids 1000 MG capsule      Take 2 g by mouth daily        fluticasone 50 MCG/ACT spray    FLONASE    1 Bottle    Spray 1-2 sprays into both nostrils daily    Post-nasal drainage       Glucosamine Sulfate 750 MG Caps      Take 1 capsule by mouth daily        * HERBALS      Take 1 each by mouth daily as needed (cold) Minus sinus includes - spearmint leafe, nettle leaf, mullein leaf, elderflowers, calendula, thyme, stevia. Only uses when he has a cold.        * HERBALS      Take 1 each by mouth daily as needed (doesnt drink every day) Sweet and spicy tea: rooibos, chicory root, rosehip, cinnamon, lemon grass, peppermint, chamomile, ginger root, anise seed, orange oil.        magnesium 250 MG tablet      Take 1 tablet by mouth daily        propranolol 20 MG tablet    INDERAL    180 tablet    Take 1 tablet (20 mg) by mouth 3 times daily as needed    Benign familial tremor       T-RELIEF PAIN ARNICA + 12 Thpk      1 Application by * route daily as needed (back pain and pain in general) Includes aconitum, arnica, belladonna, bellis perennis, calendula, officinalis, chamomilla, echinacea, hamamelis, hypericum, millefolium, wesley graveolens, symphytum officinale        Vitamin D (Cholecalciferol) 1000 UNITS Tabs      Take 1,000 Units by mouth daily        * Notice:  This list has 2 medication(s) that are the same as other medications prescribed for you. Read the directions carefully, and ask your doctor or other care provider to review them with you.

## 2017-11-16 ENCOUNTER — OFFICE VISIT (OUTPATIENT)
Dept: OPHTHALMOLOGY | Facility: CLINIC | Age: 67
End: 2017-11-16
Attending: OPHTHALMOLOGY
Payer: MEDICARE

## 2017-11-16 DIAGNOSIS — G25.0 BENIGN FAMILIAL TREMOR: ICD-10-CM

## 2017-11-16 DIAGNOSIS — H43.811 POSTERIOR VITREOUS DETACHMENT, RIGHT: Primary | ICD-10-CM

## 2017-11-16 PROCEDURE — 99212 OFFICE O/P EST SF 10 MIN: CPT | Mod: ZF

## 2017-11-16 ASSESSMENT — EXTERNAL EXAM - LEFT EYE: OS_EXAM: ESSENTIAL TREMOR

## 2017-11-16 ASSESSMENT — TONOMETRY
IOP_METHOD: TONOPEN
OD_IOP_MMHG: 12
OS_IOP_MMHG: 12

## 2017-11-16 ASSESSMENT — SLIT LAMP EXAM - LIDS
COMMENTS: NORMAL
COMMENTS: NORMAL

## 2017-11-16 ASSESSMENT — CONF VISUAL FIELD
OD_NORMAL: 1
OS_NORMAL: 1

## 2017-11-16 ASSESSMENT — EXTERNAL EXAM - RIGHT EYE: OD_EXAM: ESSENTIAL TREMOR

## 2017-11-16 ASSESSMENT — CUP TO DISC RATIO: OD_RATIO: 0.2

## 2017-11-16 ASSESSMENT — VISUAL ACUITY
OD_CC: 20/25
METHOD: SNELLEN - LINEAR
OS_CC: 20/25

## 2017-11-16 NOTE — NURSING NOTE
Chief Complaints and History of Present Illnesses   Patient presents with     Follow Up For     5 week follow up Posterior vitreous detachment, right      HPI    Affected eye(s):  Right   Symptoms:     Floaters (Comment: RE)   Flashes (Comment: RE)   No glare   No halos   No photophobia            Comments:  Posterior vitreous detachment, right   Flashes 1X every 2 weeks  Maru PACHECO 9:42 AM November 16, 2017

## 2017-11-16 NOTE — PROGRESS NOTES
Assessment & Plan      Louie Osorio is a 67 year old male with the following diagnoses:   1. Posterior vitreous detachment, right    2. Benign familial tremor         1 mo following posterior vitreous detachment (PVD).  Dilated fundus exam without tears or holes.  Not bothered by floater at this time  Discussed symptoms of retinal tear/detachment and the need to be seen urgently should they occur       Patient disposition:   Return in about 1 year (around 11/16/2018) for DFE.          Attending Physician Attestation:  Complete documentation of historical and exam elements from today's encounter can be found in the full encounter summary report (not reduplicated in this progress note).  I personally obtained the chief complaint(s) and history of present illness.  I confirmed and edited as necessary the review of systems, past medical/surgical history, family history, social history, and examination findings as documented by others; and I examined the patient myself.  I personally reviewed the relevant tests, images, and reports as documented above.  I formulated and edited as necessary the assessment and plan and discussed the findings and management plan with the patient and family. . - Raphael Roberson MD

## 2017-11-16 NOTE — MR AVS SNAPSHOT
After Visit Summary   2017    Louie Osorio    MRN: [id number]           Patient Information     Date Of Birth          1950        Visit Information        Provider Department      2017 9:15 AM Raphael Roberson MD [facility]        Today's Diagnoses     Posterior vitreous detachment, right    -  1    Benign familial tremor           Follow-ups after your visit        Follow-up notes from your care team     Return in about 1 year (around 2018) for DFE.      Who to contact     Please call your clinic at [phone] to:    Ask questions about your health    Make or cancel appointments    Discuss your medicines    Learn about your test results    Speak to your doctor   If you have compliments or concerns about an experience at your clinic, or if you wish to file a complaint, please contact [facility] Patient Relations at [phone] or email us at [email or web address]         Additional Information About Your Visit        MyChart Information     [employer] is an electronic gateway that provides easy, online access to your medical records. With [employer], you can request a clinic appointment, read your test results, renew a prescription or communicate with your care team.     To sign up for [employer] visit the website at [email or web address]   You will be asked to enter the access code listed below, as well as some personal information. Please follow the directions to create your username and password.     Your access code is: [email or web address]  Expires: 2018  5:31 AM     Your access code will  in 90 days. If you need help or a new code, please contact your [facility] or call [phone] for assistance.        Care EveryWhere ID     This is your Care EveryWhere ID. This could be used by other organizations to access your [facility] medical records  [id number]         Blood Pressure from Last 3  Encounters:   08/03/17 136/89   06/01/17 128/84   05/26/17 128/66    Weight from Last 3 Encounters:   08/03/17 101.6 kg (224 lb)   06/01/17 102.1 kg (225 lb)   05/26/17 101.7 kg (224 lb 4 oz)              Today, you had the following     No orders found for display       Primary Care Provider Office Phone # Fax #    Estuardo Inder Wilson [phone] [address] [place] [address]        Equal Access to Services     [facility]: Hadii yudy silva hadtoney Sowillard, waaxda luqadaha, qaybta kaalmaalisha avalos, los watson . So [facility] [phone].    ATENCIÓN: Si habla español, tiene a moore disposición servicios gratuitos de asistencia lingüística. [facility] [phone].    We comply with applicable federal civil rights laws and [place] laws. We do not discriminate on the basis of race, color, national origin, age, disability, sex, sexual orientation, or gender identity.            Thank you!     Thank you for choosing [facility]  for your care. Our goal is always to provide you with excellent care. Hearing back from our patients is one way we can continue to improve our services. Please take a few minutes to complete the written survey that you may receive in the mail after your visit with us. Thank you!             Your Updated Medication List - Protect others around you: Learn how to safely use, store and throw away your medicines at www.disposemymeds.org.          This list is accurate as of: 11/16/17 10:23 AM.  Always use your most recent med list.                   Brand Name Dispense Instructions for use Diagnosis    EQL FIBER SUPPLEMENT PO      Take 3 capsules by mouth daily as needed Psyllium, collinsonia powder, apple pectin, fennel, and fenugreek.  Uses once a week at the most for constipation.        fish oil-omega-3 fatty acids 1000 MG capsule      Take 2 g by mouth daily        fluticasone 50 MCG/ACT spray    FLONASE    1 Bottle    Spray 1-2 sprays  into both nostrils daily    Post-nasal drainage       Glucosamine Sulfate 750 MG Caps      Take 1 capsule by mouth daily        * HERBALS      Take 1 each by mouth daily as needed (cold) Minus sinus includes - spearmint leafe, nettle leaf, mullein leaf, elderflowers, calendula, thyme, stevia. Only uses when he has a cold.        * HERBALS      Take 1 each by mouth daily as needed (doesnt drink every day) Sweet and spicy tea: rooibos, chicory root, rosehip, cinnamon, lemon grass, peppermint, chamomile, ginger root, anise seed, orange oil.        magnesium 250 MG tablet      Take 1 tablet by mouth daily        propranolol 20 MG tablet    INDERAL    180 tablet    Take 1 tablet (20 mg) by mouth 3 times daily as needed    Benign familial tremor       T-RELIEF PAIN ARNICA + 12 TAB & OINT Thpk      1 Application by * route daily as needed (back pain and pain in general) Includes aconitum, arnica, belladonna, bellis perennis, calendula, officinalis, chamomilla, echinacea, hamamelis, hypericum, millefolium, wesley graveolens, symphytum officinale        Vitamin D (Cholecalciferol) 1000 UNITS Tabs      Take 1,000 Units by mouth daily        * Notice:  This list has 2 medication(s) that are the same as other medications prescribed for you. Read the directions carefully, and ask your doctor or other care provider to review them with you.

## 2017-12-05 ENCOUNTER — OFFICE VISIT (OUTPATIENT)
Dept: FAMILY MEDICINE | Facility: CLINIC | Age: 67
End: 2017-12-05
Payer: COMMERCIAL

## 2017-12-05 VITALS
OXYGEN SATURATION: 96 % | SYSTOLIC BLOOD PRESSURE: 139 MMHG | WEIGHT: 225.25 LBS | BODY MASS INDEX: 31.42 KG/M2 | DIASTOLIC BLOOD PRESSURE: 87 MMHG | TEMPERATURE: 98 F

## 2017-12-05 DIAGNOSIS — G25.0 BENIGN FAMILIAL TREMOR: ICD-10-CM

## 2017-12-05 DIAGNOSIS — Z13.220 SCREENING FOR HYPERLIPIDEMIA: ICD-10-CM

## 2017-12-05 DIAGNOSIS — Z23 NEED FOR PROPHYLACTIC VACCINATION AND INOCULATION AGAINST INFLUENZA: ICD-10-CM

## 2017-12-05 DIAGNOSIS — Z00.00 ROUTINE GENERAL MEDICAL EXAMINATION AT A HEALTH CARE FACILITY: Primary | ICD-10-CM

## 2017-12-05 DIAGNOSIS — Z13.1 SCREENING FOR DIABETES MELLITUS: ICD-10-CM

## 2017-12-05 DIAGNOSIS — Z12.5 SPECIAL SCREENING FOR MALIGNANT NEOPLASM OF PROSTATE: ICD-10-CM

## 2017-12-05 DIAGNOSIS — F17.200 TOBACCO USE DISORDER: ICD-10-CM

## 2017-12-05 DIAGNOSIS — Z11.59 NEED FOR HEPATITIS C SCREENING TEST: ICD-10-CM

## 2017-12-05 PROCEDURE — G0008 ADMIN INFLUENZA VIRUS VAC: HCPCS | Performed by: FAMILY MEDICINE

## 2017-12-05 PROCEDURE — G0296 VISIT TO DETERM LDCT ELIG: HCPCS | Performed by: FAMILY MEDICINE

## 2017-12-05 PROCEDURE — 86803 HEPATITIS C AB TEST: CPT | Performed by: FAMILY MEDICINE

## 2017-12-05 PROCEDURE — 90662 IIV NO PRSV INCREASED AG IM: CPT | Performed by: FAMILY MEDICINE

## 2017-12-05 PROCEDURE — 80061 LIPID PANEL: CPT | Performed by: FAMILY MEDICINE

## 2017-12-05 PROCEDURE — 99397 PER PM REEVAL EST PAT 65+ YR: CPT | Mod: 25 | Performed by: FAMILY MEDICINE

## 2017-12-05 PROCEDURE — 82947 ASSAY GLUCOSE BLOOD QUANT: CPT | Performed by: FAMILY MEDICINE

## 2017-12-05 PROCEDURE — G0103 PSA SCREENING: HCPCS | Performed by: FAMILY MEDICINE

## 2017-12-05 PROCEDURE — 36415 COLL VENOUS BLD VENIPUNCTURE: CPT | Performed by: FAMILY MEDICINE

## 2017-12-05 NOTE — MR AVS SNAPSHOT
After Visit Summary   12/5/2017    Louie Osorio    MRN: [id number]           Patient Information     Date Of Birth          1950        Visit Information        Provider Department      12/5/2017 11:40 Estuardo Teixeira MD [facility]        Today's Diagnoses     Routine general medical examination at a health care facility    -  1    Need for hepatitis C screening test        Screening for diabetes mellitus        Screening for hyperlipidemia        Tobacco use disorder        Benign familial tremor        History of tobacco use          Care Instructions      Preventive Health Recommendations:       Male Ages 65 and over    Yearly exam:             See your health care provider every year in order to  o   Review health changes.   o   Discuss preventive care.    o   Review your medicines if your doctor has prescribed any.    Talk with your health care provider about whether you should have a test to screen for prostate cancer (PSA).    Every 3 years, have a diabetes test (fasting glucose). If you are at risk for diabetes, you should have this test more often.    Every 5 years, have a cholesterol test. Have this test more often if you are at risk for high cholesterol or heart disease.     Every 10 years, have a colonoscopy. Or, have a yearly FIT test (stool test). These exams will check for colon cancer.    Talk to with your health care provider about screening for Abdominal Aortic Aneurysm if you have a family history of AAA or have a history of smoking.  Shots:     Get a flu shot each year.     Get a tetanus shot every 10 years.     Talk to your doctor about your pneumonia vaccines. There are now two you should receive - Pneumovax (PPSV 23) and Prevnar (PCV 13).    Talk to your doctor about a shingles vaccine.     Talk to your doctor about the hepatitis B vaccine.  Nutrition:     Eat at least 5 servings of fruits and vegetables each day.     Eat whole-grain bread,  whole-wheat pasta and brown rice instead of white grains and rice.     Talk to your doctor about Calcium and Vitamin D.   Lifestyle    Exercise for at least 150 minutes a week (30 minutes a day, 5 days a week). This will help you control your weight and prevent disease.     Limit alcohol to one drink per day.     No smoking.     Wear sunscreen to prevent skin cancer.     See your dentist every six months for an exam and cleaning.     See your eye doctor every 1 to 2 years to screen for conditions such as glaucoma, macular degeneration and cataracts.  Lung Cancer Screening Please call [phone] to schedule an appointment for lung cancer screening.      Frequently Asked Questions  If you are at high-risk for lung cancer, getting screened with low-dose computed tomography (LDCT) every year can help save your life. This handout offers answers to some of the most common questions about lung cancer screening. If you have other questions, please call [phone] ([phone]).     What is it?  Lung cancer screening uses special X-ray technology to create an image of your lung tissue. The exam is quick and easy and takes less than 10 seconds. We don t give you any medicine or use any needles. You can eat before and after the exam. You don t need to change your clothes as long as the clothing on your chest doesn t contain metal. But, you do need to be able to hold your breath for at least 6 seconds during the exam.    What is the goal of lung cancer screening?  The goal of lung cancer screening is to save lives. Many times, lung cancer is not found until a person starts having physical symptoms. Lung cancer screening can help detect lung cancer in the earliest stages when it may be easier to treat.    Who should be screened for lung cancer?  We suggest lung cancer screening for anyone who is at high-risk for lung cancer. You are in the high-risk group if you:      are between the ages of 55 and 79, and    have  smoked at least 1 pack of cigarettes a day for 30 or more years, and    still smoke or have quit within the past 15 years.    However, if you have a new cough or shortness of breath, you should talk to your doctor before being screened.    Some national lung health advocacy groups also recommend screening for people ages 50 to 79 who have smoked an average of 1 pack of cigarettes a day for 20 years. They must also have at least 1 other risk factor for lung cancer, not including exposure to secondhand smoke. Other risk factors are having had cancer in the past, emphysema, pulmonary fibrosis, COPD, a family history of lung cancer, or exposure to certain materials such as arsenic, asbestos, beryllium, cadmium, chromium, diesel fumes, nickel, radon or silica. Your care team can help you know if you have one of these risk factors.     Why does it matter if I have symptoms?  Certain symptoms can be a sign that you have a condition in your lungs that should be checked and treated by your doctor. These symptoms include fever, chest pain, a new or changing cough, shortness of breath that you have never felt before, coughing up blood or unexplained weight loss. Having any of these symptoms can greatly affect the results of lung cancer screening.       Should all smokers get an LDCT lung cancer screening exam?  It depends. Lung cancer screening is for a very specific group of men and women who have a history of heavy smoking over a long period of time (see  Who should be screened for lung cancer  above).  I am in the high-risk group, but have been diagnosed with cancer in the past. Is LDCT lung cancer screening right for me?  In some cases, you should not have LDCT lung screening, such as when your doctor is already following your cancer with CT scan studies. Your doctor will help you decide if LDCT lung screening is right for you.  Do I need to have a screening exam every year?  Yes. If you are in the high-risk group  described earlier, you should get an LDCT lung cancer screening exam every year until you are 79, or are no longer willing or able to undergo screening and possible procedures to diagnose and treat lung cancer.  How effective is LDCT at preventing death from lung cancer?  Studies have shown that LDCT lung cancer screening can lower the risk of death from lung cancer by 20 percent in people who are at high-risk.  What are the risks?  There are some risks and limitations of LDCT lung cancer screening. We want to make sure you understand the risks and benefits, so please let us know if you have any questions. Your doctor may want to talk with you more about these risks.    Radiation exposure: As with any exam that uses radiation, there is a very small increased risk of cancer. The amount of radiation in LDCT is small--about the same amount a person would get from a mammogram. Your doctor orders the exam when he or she feels the potential benefits outweigh the risks.    False negatives: No test is perfect, including LDCT. It is possible that you may have a medical condition, including lung cancer, that is not found during your exam. This is called a false negative result.    False positives and more testing: LDCT very often finds something in the lung that could be cancer, but in fact is not. This is called a false positive result. False positive tests often cause anxiety. To make sure these findings are not cancer, you may need to have more tests. These tests will be done only if you give us permission. Sometimes patients need a treatment that can have side effects, such as a biopsy. For more information on false positives, see  What can I expect from the results?     Findings not related to lung cancer: Your LDCT exam also takes pictures of areas of your body next to your lungs. In a very small number of cases, the CT scan will show an abnormal finding in one of these areas, such as your kidneys, adrenal glands, liver  or thyroid. This finding may not be serious, but you may need more tests. Your doctor can help you decide what other tests you may need, if any.  What can I expect from the results?  About 1 out of 4 LDCT exams will find something that may need more tests. Most of the time, these findings are lung nodules. Lung nodules are very small collections of tissue in the lung. These nodules are very common, and the vast majority--more than 97 percent--are not cancer (benign). Most are normal lymph nodes or small areas of scarring from past infections.  But, if a small lung nodule is found to be cancer, the cancer can be cured more than 90 percent of the time. To know if the nodule is cancer, we may need to get more images before your next yearly screening exam. If the nodule has suspicious features (for example, it is large, has an odd shape or grows over time), we will refer you to a specialist for further testing.  Will my doctor also get the results?  Yes. Your doctor will get a copy of your results.  Is it okay to keep smoking now that there s a cancer screening exam?  No. Tobacco is one of the strongest cancer-causing agents. It causes not only lung cancer, but other cancers and cardiovascular (heart) diseases as well. The damage caused by smoking builds over time. This means that the longer you smoke, the higher your risk of disease. While it is never too late to quit, the sooner you quit, the better.  Where can I find help to quit smoking?  The best way to prevent lung cancer is to stop smoking. If you have already quit smoking, congratulations and keep it up! For help on quitting smoking, please call [employer] at 0-407-392-OCBE (2678) or the American Cancer Society at 1-154.154.7749 to find local resources near you.  One-on-one health coaching:  If you d prefer to work individually with a health care provider on tobacco cessation, we offer:      Medication Therapy Management:  Our specially trained pharmacists work  "closely with you and your doctor to help you quit smoking.  Call [phone] or [phone] (toll free).     Can Do: Health coaching offered by [facility].  [email or web address]              Follow-ups after your visit        Future tests that were ordered for you today     Open Future Orders        Priority Expected Expires Ordered    CT Chest Lung Cancer Scrn Low Dose wo Routine  2018            Who to contact     If you have questions or need follow up information about today's clinic visit or your schedule please contact [facility] directly at [phone].  Normal or non-critical lab and imaging results will be communicated to you by Mobile Bridgehart, letter or phone within 4 business days after the clinic has received the results. If you do not hear from us within 7 days, please contact the clinic through Mobile Bridgehart or phone. If you have a critical or abnormal lab result, we will notify you by phone as soon as possible.  Submit refill requests through imgfave or call your pharmacy and they will forward the refill request to us. Please allow 3 business days for your refill to be completed.          Additional Information About Your Visit        imgfave Information     imgfave lets you send messages to your doctor, view your test results, renew your prescriptions, schedule appointments and more. To sign up, go to [email or web address] . Click on \"Log in\" on the left side of the screen, which will take you to the Welcome page. Then click on \"Sign up Now\" on the right side of the page.     You will be asked to enter the access code listed below, as well as some personal information. Please follow the directions to create your username and password.     Your access code is: [email or web address]  Expires: 2018  5:31 AM     Your access code will  in 90 days. If you need help or a new code, please call your [facility] or [phone].        Care EveryWhere ID     This " is your Care EveryWhere ID. This could be used by other organizations to access your [place] medical records  [id number]        Your Vitals Were     Temperature Pulse Oximetry BMI (Body Mass Index)             98  F (36.7  C) (Oral) 96% 31.42 kg/m2          Blood Pressure from Last 3 Encounters:   12/05/17 139/87   08/03/17 136/89   06/01/17 128/84    Weight from Last 3 Encounters:   12/05/17 225 lb 4 oz (102.2 kg)   08/03/17 224 lb (101.6 kg)   06/01/17 225 lb (102.1 kg)              We Performed the Following     Glucose     Hepatitis C Screen Reflex to HCV RNA Quant and Genotype     Lipid panel reflex to direct LDL Non-fasting     Okay for Smoking Cessation Study (PLUTO) to Contact Patient     Prof fee: Shared Decisionmaking for Lung Cancer Screening        Primary Care Provider Office Phone # Fax #    Estuardo Inder Wilson [phone]       [facility] [place] [address]        Equal Access to Services     SWATHI VALENCIA AH: Hadii yudy silva hadasho Soomaali, waaxda luqadaha, qaybta kaalmada adeegyada, waxay cierra watson . So [facility] [phone].    ATENCIÓN: Si habla español, tiene a moore disposición servicios gratuitos de asistencia lingüística. [facility] [phone].    We comply with applicable federal civil rights laws and [place] laws. We do not discriminate on the basis of race, color, national origin, age, disability, sex, sexual orientation, or gender identity.            Thank you!     Thank you for choosing [facility]  for your care. Our goal is always to provide you with excellent care. Hearing back from our patients is one way we can continue to improve our services. Please take a few minutes to complete the written survey that you may receive in the mail after your visit with us. Thank you!             Your Updated Medication List - Protect others around you: Learn how to safely use, store and throw away your medicines at  www.disposemymeds.org.          This list is accurate as of: 12/5/17 12:20 PM.  Always use your most recent med list.                   Brand Name Dispense Instructions for use Diagnosis    EQL FIBER SUPPLEMENT PO      Take 3 capsules by mouth daily as needed Psyllium, collinsonia powder, apple pectin, fennel, and fenugreek.  Uses once a week at the most for constipation.        fish oil-omega-3 fatty acids 1000 MG capsule      Take 2 g by mouth daily        fluticasone 50 MCG/ACT spray    FLONASE    1 Bottle    Spray 1-2 sprays into both nostrils daily    Post-nasal drainage       Glucosamine Sulfate 750 MG Caps      Take 1 capsule by mouth daily        * HERBALS      Take 1 each by mouth daily as needed (cold) Minus sinus includes - spearmint leafe, nettle leaf, mullein leaf, elderflowers, calendula, thyme, stevia. Only uses when he has a cold.        * HERBALS      Take 1 each by mouth daily as needed (doesnt drink every day) Sweet and spicy tea: rooibos, chicory root, rosehip, cinnamon, lemon grass, peppermint, chamomile, ginger root, anise seed, orange oil.        magnesium 250 MG tablet      Take 1 tablet by mouth daily        propranolol 20 MG tablet    INDERAL    180 tablet    Take 1 tablet (20 mg) by mouth 3 times daily as needed    Benign familial tremor       T-RELIEF PAIN ARNICA + 12 TAB & OINT Thpk      1 Application by * route daily as needed (back pain and pain in general) Includes aconitum, arnica, belladonna, bellis perennis, calendula, officinalis, chamomilla, echinacea, hamamelis, hypericum, millefolium, wesley graveolens, symphytum officinale        Vitamin D (Cholecalciferol) 1000 UNITS Tabs      Take 1,000 Units by mouth daily        * Notice:  This list has 2 medication(s) that are the same as other medications prescribed for you. Read the directions carefully, and ask your doctor or other care provider to review them with you.

## 2017-12-05 NOTE — PROGRESS NOTES
Injectable Influenza Immunization Documentation    1.  Is the person to be vaccinated sick today?   No    2. Does the person to be vaccinated have an allergy to a component   of the vaccine?   No  Egg Allergy Algorithm Link    3. Has the person to be vaccinated ever had a serious reaction   to influenza vaccine in the past?   No    4. Has the person to be vaccinated ever had Guillain-Barré syndrome?   No    Form completed by Tree castañeda sma    Per orders of Dr. Wilson, injection of flu vaccine given by Tree Castañeda. Patient instructed to remain in clinic for 15 minutes afterwards, and to report any adverse reaction to me immediately.  Prior to injection verified patient identity using patient's name and date of birth.

## 2017-12-05 NOTE — NURSING NOTE
"Chief Complaint   Patient presents with     Physical       Initial /87  Temp 98  F (36.7  C) (Oral)  Wt 225 lb 4 oz (102.2 kg)  SpO2 96%  BMI 31.42 kg/m2 Estimated body mass index is 31.42 kg/(m^2) as calculated from the following:    Height as of 5/26/17: 5' 11\" (1.803 m).    Weight as of this encounter: 225 lb 4 oz (102.2 kg).  Medication Reconciliation: complete  Tree davis sma      "

## 2017-12-05 NOTE — PROGRESS NOTES
SUBJECTIVE:   Louie Osorio is a 67 year old male who presents for Preventive Visit.    Are you in the first 12 months of your Medicare Part B coverage?  No    Healthy Habits:    Do you get at least three servings of calcium containing foods daily (dairy, green leafy vegetables, etc.)? yes    Amount of exercise or daily activities, outside of work: walking alot    Problems taking medications regularly No    Medication side effects: No    Have you had an eye exam in the past two years? yes    Do you see a dentist twice per year? yes    Do you have sleep apnea, excessive snoring or daytime drowsiness? Snoring     COGNITIVE SCREEN  1) Repeat 3 items (Banana, Sunrise, Chair)    2) Clock draw: n/a pt have temors  3) 3 item recall: Recalls 2 objects   Results: ABNORMAL clock, 1-2 items recalled: PROBABLE COGNITIVE IMPAIRMENT, **INFORM PROVIDER**    Mini-CogTM Copyright [employer]. Licensed by the author for use in [facility]; reprinted with permission (aaron@[email or web address]). All rights reserved.      Reviewed and updated as needed this visit by clinical staffTobacco  Allergies  Meds       Reviewed and updated as needed this visit by Provider    Social History   Substance Use Topics     Smoking status: Current Some Day Smoker     Years: 42.00     Types: Cigarettes     Smokeless tobacco: Never Used      Comment: Smoker - <1 ppd started at 17 years     Alcohol use Yes      Comment: 15 drinks a week             Standardized Alcohol Screening Questionnaire  AUDIT   Questions 0 1 2 3 4 Score   1. How often do you have a drink  containing alcohol? Never Monthly or less 2 to 4  times a  month 2 to 3  times a  week 4 or more  times a  week  4   2. How many drinks containing alcohol  do you have on a typical day when you are drinking? 1 or 2 3 or 4 5 or 6 7 to 9 10 or more  2   3. How often do you have more than five  or more drinks on one occasion? Never Less  than  monthly Monthly Weekly Daily or  almost  daily    3    4. How often during the last year have  you found that you were not able to stop drinking once you had started? Never Less  than  monthly Monthly Weekly Daily or  almost  daily  0   5. How often during the last year have  you failed to do what was normally expected of you because of drinking? Never Less  than  monthly Monthly Weekly Daily or  almost  daily  0   6. How often during the last year have  you needed a first drink in the morning to get yourself going after a heavy drinking session? Never Less  than  monthly Monthly Weekly Daily or  almost  daily  0   7. How often during the last year have you had a feeling of guilt or remorse after drinking? Never Less  than  monthly Monthly Weekly Daily or  almost  daily  0   8. How often during the last year have  you been unable to remember what happened the night before because of your drinking? Never Less  than  monthly Monthly Weekly Daily or  almost  daily  2   9. Have you or someone else been  injured because of your drinking? No  Yes, but not in the last year  Yes,  during the  last year  0   10. Has a relative, friend, doctor or other health care worker been concerned about your drinking or suggested you cut down? No  Yes, but not in the last year  Yes,  during the  last year  0   Total  12   Scoring: A score of 7 for adult men is an indication of hazardous drinking (risk for physical or physiological harm); a score of 8 or more is an indication of an alcohol use disorder. A score of 5 or more for adult women  is an indication of hazardous drinking or an alcohol use disorder.     Today's PHQ-2 Score:   PHQ-2 ( 1999 Pf[employer]) 12/5/2017 5/26/2017   Q1: Little interest or pleasure in doing things 0 0   Q2: Feeling down, depressed or hopeless 0 0   PHQ-2 Score 0 0     Do you feel safe in your environment - Yes    Do you have a Health Care Directive?: No: Advance care planning was reviewed with patient; patient declined at this time.    Current providers sharing in  care for this patient include: Patient Care Team:  Estuardo Wilson MD as PCP - General (Family Practice)      Hearing impairment: No    Ability to successfully perform activities of daily living: Yes, no assistance needed     Fall risk:  Fallen 2 or more times in the past year?: No  Any fall with injury in the past year?: No  Home safety:  none identified     The following health maintenance items are reviewed in Epic and correct as of today:  Health Maintenance   Topic Date Due     COLON CANCER SCREEN (SYSTEM ASSIGNED)  10/20/2001     ADVANCE DIRECTIVE PLANNING Q5 YRS  09/27/2016     PNEUMOCOCCAL (2 of 2 - PPSV23) 05/26/2018     FALL RISK ASSESSMENT  11/16/2018     TETANUS IMMUNIZATION (SYSTEM ASSIGNED)  11/01/2022     LIPID SCREEN Q5 YR MALE (SYSTEM ASSIGNED)  12/05/2022     INFLUENZA VACCINE (SYSTEM ASSIGNED)  Addressed     AORTIC ANEURYSM SCREENING (SYSTEM ASSIGNED)  Completed     HEPATITIS C SCREENING  Completed     Labs reviewed in EPIC     Not using propranolol on regula basis - feels he can keep his tremors under good control.     Smoking since 1970s. Quit intermittently in between.     Skin lesion on right deltoid area.     ROS:  C: NEGATIVE for fever, chills, change in weight  I: NEGATIVE for worrisome rashes, moles or lesions  E: NEGATIVE for vision changes or irritation  E/M: NEGATIVE for ear, mouth and throat problems  R: NEGATIVE for significant cough or SOB  B: NEGATIVE for masses, tenderness or discharge  CV: NEGATIVE for chest pain, palpitations or peripheral edema  GI: NEGATIVE for nausea, abdominal pain, heartburn, or change in bowel habits  : NEGATIVE for frequency, dysuria, or hematuria  M: NEGATIVE for significant arthralgias or myalgia  N: NEGATIVE for weakness, dizziness or paresthesias  E: NEGATIVE for temperature intolerance, skin/hair changes  H: NEGATIVE for bleeding problems  P: NEGATIVE for changes in mood or affect    OBJECTIVE:   /87  Temp 98  F (36.7  C) (Oral)   "Wt 225 lb 4 oz (102.2 kg)  SpO2 96%  BMI 31.42 kg/m2 Estimated body mass index is 31.42 kg/(m^2) as calculated from the following:    Height as of 5/26/17: 5' 11\" (1.803 m).    Weight as of this encounter: 225 lb 4 oz (102.2 kg).  EXAM:   GENERAL: healthy, alert and no distress  EYES: Eyes grossly normal to inspection, PERRL and conjunctivae and sclerae normal  HENT: ear canals and TM's normal, nose and mouth without ulcers or lesions  NECK: no adenopathy, no asymmetry, masses, or scars and thyroid normal to palpation  RESP: lungs clear to auscultation - no rales, rhonchi or wheezes  CV: regular rate and rhythm, normal S1 S2, no S3 or S4, no murmur, click or rub, no peripheral edema and peripheral pulses strong  ABDOMEN: soft, nontender, no hepatosplenomegaly, no masses and bowel sounds normal  MS: no gross musculoskeletal defects noted, no edema   SKIN: right lower deltoid area - small actinic keratosis present.   NEURO: coarse tremors, speech slightly impaired due to that,   PSYCH: mentation appears normal, affect normal/bright    ASSESSMENT / PLAN:   1. Routine general medical examination at a health care facility       2. Benign familial tremor  Not using betablocker. He is comfortable with how he is feeling. Does not need further intervetion.    3. Tobacco use disorder   ongoing. Does not want to quit. Qualifies for lung cancer screen.   - Prof fee: Shared Decisionmaking for Lung Cancer Screening  - CT Chest Lung Cancer Scrn Low Dose wo; Future  - Okay for Smoking Cessation Study (PLUTO) to Contact Patient    4. Need for hepatitis C screening test     - Hepatitis C Screen Reflex to HCV RNA Quant and Genotype    5. Screening for diabetes mellitus     - Glucose    6. Screening for hyperlipidemia     - Lipid panel reflex to direct LDL Non-fasting    7. Special screening for malignant neoplasm of prostate     - PSA, screen    8. Need for prophylactic vaccination and inoculation against influenza     - Vaccine " "Administration, Initial [16698]  - FLU VACCINE, INCREASED ANTIGEN, PRESV FREE, AGE 65+ [11018]    End of Life Planning:  Patient currently has an advanced directive: full code.    COUNSELING:  Reviewed preventive health counseling, as reflected in patient instructions  Special attention given to:       Regular exercise       Healthy diet/nutrition       Vision screening       Hearing screening       Dental care       Colon cancer screening       Prostate cancer screening         BP Screening:   Last 3 BP Readings:    BP Readings from Last 3 Encounters:   12/05/17 139/87   08/03/17 136/89   06/01/17 128/84       The following was recommended to the patient:  Re-screen BP within a year and recommended lifestyle modifications    Estimated body mass index is 31.42 kg/(m^2) as calculated from the following:    Height as of 5/26/17: 5' 11\" (1.803 m).    Weight as of this encounter: 225 lb 4 oz (102.2 kg).  Weight management plan: Discussed healthy diet and exercise guidelines and patient will follow up in 12 months in clinic to re-evaluate.   reports that he has been smoking Cigarettes.  He has smoked for the past 42.00 years. He has never used smokeless tobacco.  Tobacco Cessation Action Plan: Information offered: Patient not interested at this time  Self help information given to patient    Appropriate preventive services were discussed with this patient, including applicable screening as appropriate for cardiovascular disease, diabetes, osteopenia/osteoporosis, and glaucoma.  As appropriate for age/gender, discussed screening for colorectal cancer, prostate cancer, breast cancer, and cervical cancer. Checklist reviewing preventive services available has been given to the patient.    Reviewed patients plan of care and provided an AVS. The Basic Care Plan (routine screening as documented in Health Maintenance) for Louie meets the Care Plan requirement. This Care Plan has been established and reviewed with the " Patient.    Counseling Resources:  ATP IV Guidelines  Pooled Cohorts Equation Calculator  Breast Cancer Risk Calculator  FRAX Risk Assessment  ICSI Preventive Guidelines  Dietary Guidelines for Americans, 2010  [employer]'s MyPlate  ASA Prophylaxis  Lung CA Screening    Estuardo Inder Wilson MD, MD  [facility]  Lung Cancer Screening Shared Decision Making Visit     Louie Osorio is eligible for lung cancer screening on the basis of the information provided in my signed lung cancer screening order.     I have discussed with patient the risks and benefits of screening for lung cancer with low-dose CT.     The risks include:   radiation exposure    false positives     over-diagnosis    The benefit of early detection of lung cancer is contingent upon adherence to annual screening or more frequent follow up if indicated.     Furthermore, reaping the benefits of screening requires Louie Osorio to be willing and physically able to undergo diagnostic procedures, if indicated. Although no specific guide is available for determining severity of comorbidities, it is reasonable to withhold screening in patients who have greater mortality risk from other diseases.     We did discuss that the only way to prevent lung cancer is to not smoke. Smoking cessation assistance was offered.    I did offer risk estimation using a calculator such as this one:    ShouldIScreen

## 2017-12-05 NOTE — LETTER
December 7, 2017      Louie Osorio  [address]  [facility] [address]        Dear ,    We are writing to inform you of your test results.    Normal results.     Resulted Orders   Hepatitis C Screen Reflex to HCV RNA Quant and Genotype   Result Value Ref Range    Hepatitis C Antibody Nonreactive NR^Nonreactive      Comment:      Assay performance characteristics have not been established for newborns,   infants, and children     Lipid panel reflex to direct LDL Non-fasting   Result Value Ref Range    Cholesterol 166 <200 mg/dL    Triglycerides 89 <150 mg/dL      Comment:      Non Fasting    HDL Cholesterol 54 >39 mg/dL    LDL Cholesterol Calculated 94 <100 mg/dL      Comment:      Desirable:       <100 mg/dl    Non HDL Cholesterol 112 <130 mg/dL   Glucose   Result Value Ref Range    Glucose 87 70 - 99 mg/dL      Comment:      Non Fasting   PSA, screen   Result Value Ref Range    PSA 2.60 0 - 4 ug/L      Comment:      Assay Method:  Chemiluminescence using Siemens Vista analyzer       If you have any questions or concerns, please call the clinic at the number listed above.       Sincerely,        Estuardo Wilson MD/nr

## 2017-12-05 NOTE — PATIENT INSTRUCTIONS
Preventive Health Recommendations:       Male Ages 65 and over    Yearly exam:             See your health care provider every year in order to  o   Review health changes.   o   Discuss preventive care.    o   Review your medicines if your doctor has prescribed any.    Talk with your health care provider about whether you should have a test to screen for prostate cancer (PSA).    Every 3 years, have a diabetes test (fasting glucose). If you are at risk for diabetes, you should have this test more often.    Every 5 years, have a cholesterol test. Have this test more often if you are at risk for high cholesterol or heart disease.     Every 10 years, have a colonoscopy. Or, have a yearly FIT test (stool test). These exams will check for colon cancer.    Talk to with your health care provider about screening for Abdominal Aortic Aneurysm if you have a family history of AAA or have a history of smoking.  Shots:     Get a flu shot each year.     Get a tetanus shot every 10 years.     Talk to your doctor about your pneumonia vaccines. There are now two you should receive - Pneumovax (PPSV 23) and Prevnar (PCV 13).    Talk to your doctor about a shingles vaccine.     Talk to your doctor about the hepatitis B vaccine.  Nutrition:     Eat at least 5 servings of fruits and vegetables each day.     Eat whole-grain bread, whole-wheat pasta and brown rice instead of white grains and rice.     Talk to your doctor about Calcium and Vitamin D.   Lifestyle    Exercise for at least 150 minutes a week (30 minutes a day, 5 days a week). This will help you control your weight and prevent disease.     Limit alcohol to one drink per day.     No smoking.     Wear sunscreen to prevent skin cancer.     See your dentist every six months for an exam and cleaning.     See your eye doctor every 1 to 2 years to screen for conditions such as glaucoma, macular degeneration and cataracts.  Lung Cancer Screening Please call [phone] to schedule  an appointment for lung cancer screening.      Frequently Asked Questions  If you are at high-risk for lung cancer, getting screened with low-dose computed tomography (LDCT) every year can help save your life. This handout offers answers to some of the most common questions about lung cancer screening. If you have other questions, please call [phone] ([phone]).     What is it?  Lung cancer screening uses special X-ray technology to create an image of your lung tissue. The exam is quick and easy and takes less than 10 seconds. We don t give you any medicine or use any needles. You can eat before and after the exam. You don t need to change your clothes as long as the clothing on your chest doesn t contain metal. But, you do need to be able to hold your breath for at least 6 seconds during the exam.    What is the goal of lung cancer screening?  The goal of lung cancer screening is to save lives. Many times, lung cancer is not found until a person starts having physical symptoms. Lung cancer screening can help detect lung cancer in the earliest stages when it may be easier to treat.    Who should be screened for lung cancer?  We suggest lung cancer screening for anyone who is at high-risk for lung cancer. You are in the high-risk group if you:      are between the ages of 55 and 79, and    have smoked at least 1 pack of cigarettes a day for 30 or more years, and    still smoke or have quit within the past 15 years.    However, if you have a new cough or shortness of breath, you should talk to your doctor before being screened.    Some national lung health advocacy groups also recommend screening for people ages 50 to 79 who have smoked an average of 1 pack of cigarettes a day for 20 years. They must also have at least 1 other risk factor for lung cancer, not including exposure to secondhand smoke. Other risk factors are having had cancer in the past, emphysema, pulmonary fibrosis, COPD, a family  history of lung cancer, or exposure to certain materials such as arsenic, asbestos, beryllium, cadmium, chromium, diesel fumes, nickel, radon or silica. Your care team can help you know if you have one of these risk factors.     Why does it matter if I have symptoms?  Certain symptoms can be a sign that you have a condition in your lungs that should be checked and treated by your doctor. These symptoms include fever, chest pain, a new or changing cough, shortness of breath that you have never felt before, coughing up blood or unexplained weight loss. Having any of these symptoms can greatly affect the results of lung cancer screening.       Should all smokers get an LDCT lung cancer screening exam?  It depends. Lung cancer screening is for a very specific group of men and women who have a history of heavy smoking over a long period of time (see  Who should be screened for lung cancer  above).  I am in the high-risk group, but have been diagnosed with cancer in the past. Is LDCT lung cancer screening right for me?  In some cases, you should not have LDCT lung screening, such as when your doctor is already following your cancer with CT scan studies. Your doctor will help you decide if LDCT lung screening is right for you.  Do I need to have a screening exam every year?  Yes. If you are in the high-risk group described earlier, you should get an LDCT lung cancer screening exam every year until you are 79, or are no longer willing or able to undergo screening and possible procedures to diagnose and treat lung cancer.  How effective is LDCT at preventing death from lung cancer?  Studies have shown that LDCT lung cancer screening can lower the risk of death from lung cancer by 20 percent in people who are at high-risk.  What are the risks?  There are some risks and limitations of LDCT lung cancer screening. We want to make sure you understand the risks and benefits, so please let us know if you have any questions. Your  doctor may want to talk with you more about these risks.    Radiation exposure: As with any exam that uses radiation, there is a very small increased risk of cancer. The amount of radiation in LDCT is small about the same amount a person would get from a mammogram. Your doctor orders the exam when he or she feels the potential benefits outweigh the risks.    False negatives: No test is perfect, including LDCT. It is possible that you may have a medical condition, including lung cancer, that is not found during your exam. This is called a false negative result.    False positives and more testing: LDCT very often finds something in the lung that could be cancer, but in fact is not. This is called a false positive result. False positive tests often cause anxiety. To make sure these findings are not cancer, you may need to have more tests. These tests will be done only if you give us permission. Sometimes patients need a treatment that can have side effects, such as a biopsy. For more information on false positives, see  What can I expect from the results?     Findings not related to lung cancer: Your LDCT exam also takes pictures of areas of your body next to your lungs. In a very small number of cases, the CT scan will show an abnormal finding in one of these areas, such as your kidneys, adrenal glands, liver or thyroid. This finding may not be serious, but you may need more tests. Your doctor can help you decide what other tests you may need, if any.  What can I expect from the results?  About 1 out of 4 LDCT exams will find something that may need more tests. Most of the time, these findings are lung nodules. Lung nodules are very small collections of tissue in the lung. These nodules are very common, and the vast majority more than 97 percent are not cancer (benign). Most are normal lymph nodes or small areas of scarring from past infections.  But, if a small lung nodule is found to be cancer, the cancer can be  cured more than 90 percent of the time. To know if the nodule is cancer, we may need to get more images before your next yearly screening exam. If the nodule has suspicious features (for example, it is large, has an odd shape or grows over time), we will refer you to a specialist for further testing.  Will my doctor also get the results?  Yes. Your doctor will get a copy of your results.  Is it okay to keep smoking now that there s a cancer screening exam?  No. Tobacco is one of the strongest cancer-causing agents. It causes not only lung cancer, but other cancers and cardiovascular (heart) diseases as well. The damage caused by smoking builds over time. This means that the longer you smoke, the higher your risk of disease. While it is never too late to quit, the sooner you quit, the better.  Where can I find help to quit smoking?  The best way to prevent lung cancer is to stop smoking. If you have already quit smoking, congratulations and keep it up! For help on quitting smoking, please call [employer] at [phone] ([phone]) or the American Cancer Society at 1-705.197.9344 to find local resources near you.  One-on-one health coaching:  If you d prefer to work individually with a health care provider on tobacco cessation, we offer:      Medication Therapy Management:  Our specially trained pharmacists work closely with you and your doctor to help you quit smoking.  Call [phone] or [phone] (toll free).     Can Do: Health coaching offered by [facility].  [email or web address]

## 2017-12-06 LAB
CHOLEST SERPL-MCNC: 166 MG/DL
GLUCOSE SERPL-MCNC: 87 MG/DL (ref 70–99)
HCV AB SERPL QL IA: NONREACTIVE
HDLC SERPL-MCNC: 54 MG/DL
LDLC SERPL CALC-MCNC: 94 MG/DL
NONHDLC SERPL-MCNC: 112 MG/DL
PSA SERPL-ACNC: 2.6 UG/L (ref 0–4)
TRIGL SERPL-MCNC: 89 MG/DL

## 2018-05-31 ENCOUNTER — TELEPHONE (OUTPATIENT)
Dept: FAMILY MEDICINE | Facility: CLINIC | Age: 68
End: 2018-05-31

## 2018-06-12 RX ORDER — ONDANSETRON 2 MG/ML
4 INJECTION INTRAMUSCULAR; INTRAVENOUS
Status: CANCELLED | OUTPATIENT
Start: 2018-06-12

## 2018-06-12 RX ORDER — LIDOCAINE 40 MG/G
CREAM TOPICAL
Status: CANCELLED | OUTPATIENT
Start: 2018-06-12

## 2018-07-16 ENCOUNTER — SURGERY (OUTPATIENT)
Age: 68
End: 2018-07-16

## 2018-07-16 ENCOUNTER — HOSPITAL ENCOUNTER (OUTPATIENT)
Facility: CLINIC | Age: 68
Discharge: HOME | End: 2018-07-16
Attending: COLON & RECTAL SURGERY | Admitting: COLON & RECTAL SURGERY
Payer: MEDICARE

## 2018-07-16 VITALS
BODY MASS INDEX: 29.8 KG/M2 | RESPIRATION RATE: 29 BRPM | HEIGHT: 72 IN | DIASTOLIC BLOOD PRESSURE: 88 MMHG | OXYGEN SATURATION: 93 % | SYSTOLIC BLOOD PRESSURE: 119 MMHG | WEIGHT: 220 LBS

## 2018-07-16 LAB — COLONOSCOPY: NORMAL

## 2018-07-16 PROCEDURE — 88305 TISSUE EXAM BY PATHOLOGIST: CPT | Performed by: COLON & RECTAL SURGERY

## 2018-07-16 PROCEDURE — G0500 MOD SEDAT ENDO SERVICE >5YRS: HCPCS | Performed by: COLON & RECTAL SURGERY

## 2018-07-16 PROCEDURE — 45380 COLONOSCOPY AND BIOPSY: CPT | Mod: PT,XU | Performed by: COLON & RECTAL SURGERY

## 2018-07-16 PROCEDURE — 25000128 H RX IP 250 OP 636: Performed by: COLON & RECTAL SURGERY

## 2018-07-16 PROCEDURE — 45385 COLONOSCOPY W/LESION REMOVAL: CPT | Mod: PT | Performed by: COLON & RECTAL SURGERY

## 2018-07-16 PROCEDURE — 88305 TISSUE EXAM BY PATHOLOGIST: CPT | Mod: 26 | Performed by: COLON & RECTAL SURGERY

## 2018-07-16 RX ORDER — FENTANYL CITRATE 50 UG/ML
INJECTION, SOLUTION INTRAMUSCULAR; INTRAVENOUS PRN
Status: DISCONTINUED | OUTPATIENT
Start: 2018-07-16 | End: 2018-07-16 | Stop reason: HOSPADM

## 2018-07-16 RX ADMIN — MIDAZOLAM 2 MG: 1 INJECTION INTRAMUSCULAR; INTRAVENOUS at 10:53

## 2018-07-16 RX ADMIN — FENTANYL CITRATE 100 MCG: 50 INJECTION, SOLUTION INTRAMUSCULAR; INTRAVENOUS at 10:51

## 2018-07-16 NOTE — H&P
Pre-Endoscopy History and Physical     Louie Osorio MRN# [id number]   YOB: 1950 Age: 67 year old     Date of Procedure: 7/16/2018  Primary care provider: Estuardo Wilson  Type of Endoscopy: colonoscopy  Reason for Procedure: screening  Type of Anesthesia Anticipated: Moderate Sedation    HPI:    Louie is a 67 year old male who will be undergoing the above procedure.      A history and physical has been performed. The patient's medications and allergies have been reviewed. The risks and benefits of the procedure including the risk of bleeding, perforation, and missed lesions as well as the sedation options and risks were discussed with the patient.  All questions were answered and informed consent was obtained.      No Known Allergies     No current facility-administered medications for this encounter.        Prescriptions Prior to Admission   Medication Sig Dispense Refill Last Dose     Corn Dextrin (EQL FIBER SUPPLEMENT PO) Take 3 capsules by mouth daily as needed Psyllium, collinsonia powder, apple pectin, fennel, and fenugreek.  Uses once a week at the most for constipation.   Past Month     fish oil-omega-3 fatty acids 1000 MG capsule Take 2 g by mouth daily   Past Month     fluticasone (FLONASE) 50 MCG/ACT spray Spray 1-2 sprays into both nostrils daily 1 Bottle 0 Past Month     Glucosamine Sulfate 750 MG CAPS Take 1 capsule by mouth daily   Past Month     HERBALS Take 1 each by mouth daily as needed (cold) Minus sinus includes - spearmint leafe, nettle leaf, mullein leaf, elderflowers, calendula, thyme, stevia. Only uses when he has a cold.   Past Month     HERBALS Take 1 each by mouth daily as needed (doesnt drink every day) Sweet and spicy tea: rooibos, chicory root, rosehip, cinnamon, lemon grass, peppermint, chamomile, ginger root, anise seed, orange oil.   Past Month     Homeopathic Products (T-RELIEF PAIN ARNICA + 12) THPK 1 Application by * route daily as needed (back  pain and pain in general) Includes aconitum, arnica, belladonna, bellis perennis, calendula, officinalis, chamomilla, echinacea, hamamelis, hypericum, millefolium, wesley graveolens, symphytum officinale   Past Month     magnesium 250 MG tablet Take 1 tablet by mouth daily   Past Month     Vitamin D, Cholecalciferol, 1000 UNITS TABS Take 1,000 Units by mouth daily   Past Month     propranolol (INDERAL) 20 MG tablet Take 1 tablet (20 mg) by mouth 3 times daily as needed (Patient not taking: Reported on 12/5/2017) 180 tablet 1 never       Patient Active Problem List   Diagnosis     Benign familial tremor     Tobacco use disorder        Past Medical History:   Diagnosis Date     CARDIOVASCULAR SCREENING; LDL GOAL LESS THAN 160 5/9/2010     Tobacco use disorder     Smoking 1/2 ppd, Started at 17 years     Tremors, familial     Benign        Past Surgical History:   Procedure Laterality Date     C ORAL SURGERY PROCEDURE  1967    [place] Teeth Extraction     HC REMOVAL GALLBLADDER  1995       Social History   Substance Use Topics     Smoking status: Current Some Day Smoker     Years: 42.00     Types: Cigarettes     Smokeless tobacco: Never Used      Comment: Smoker - <1 ppd started at 17 years     Alcohol use Yes      Comment: 15 drinks a week        Family History   Problem Relation Age of Onset     Thyroid Disease Mother      Glaucoma No family hx of      Macular Degeneration No family hx of          PHYSICAL EXAM:   /90  Ht 1.829 m (6')  Wt 99.8 kg (220 lb)  SpO2 95%  BMI 29.84 kg/m2 Estimated body mass index is 29.84 kg/(m^2) as calculated from the following:    Height as of this encounter: 1.829 m (6').    Weight as of this encounter: 99.8 kg (220 lb).   Mental status - alert and oriented  RESP: lungs clear  CV: RRR  AIRWAY EXAM: Mallampatti Class IV (visualization of the hard palate only, soft palate not visable)    IMPRESSION   ASA Class 2 - Mild systemic disease      Signed Electronically by: Barrera ALMENDAREZ  Ananya  July 16, 2018    Colorectal Surgery  [phone] (office)  [phone] (pager)  [email or web address]

## 2018-07-17 LAB — COPATH REPORT: NORMAL

## 2018-10-16 ENCOUNTER — OFFICE VISIT (OUTPATIENT)
Dept: FAMILY MEDICINE | Facility: CLINIC | Age: 68
End: 2018-10-16
Payer: COMMERCIAL

## 2018-10-16 VITALS
TEMPERATURE: 98.5 F | OXYGEN SATURATION: 97 % | HEART RATE: 86 BPM | RESPIRATION RATE: 16 BRPM | BODY MASS INDEX: 31.22 KG/M2 | DIASTOLIC BLOOD PRESSURE: 86 MMHG | SYSTOLIC BLOOD PRESSURE: 122 MMHG | WEIGHT: 230.5 LBS | HEIGHT: 72 IN

## 2018-10-16 DIAGNOSIS — F17.200 TOBACCO USE DISORDER: ICD-10-CM

## 2018-10-16 DIAGNOSIS — J20.9 ACUTE BRONCHITIS, UNSPECIFIED ORGANISM: Primary | ICD-10-CM

## 2018-10-16 DIAGNOSIS — Z23 ENCOUNTER FOR IMMUNIZATION: ICD-10-CM

## 2018-10-16 DIAGNOSIS — Z23 NEED FOR PROPHYLACTIC VACCINATION AND INOCULATION AGAINST INFLUENZA: ICD-10-CM

## 2018-10-16 PROCEDURE — 99214 OFFICE O/P EST MOD 30 MIN: CPT | Mod: 25 | Performed by: FAMILY MEDICINE

## 2018-10-16 PROCEDURE — 90662 IIV NO PRSV INCREASED AG IM: CPT | Performed by: FAMILY MEDICINE

## 2018-10-16 PROCEDURE — G0009 ADMIN PNEUMOCOCCAL VACCINE: HCPCS | Performed by: FAMILY MEDICINE

## 2018-10-16 PROCEDURE — G0008 ADMIN INFLUENZA VIRUS VAC: HCPCS | Performed by: FAMILY MEDICINE

## 2018-10-16 PROCEDURE — 90670 PCV13 VACCINE IM: CPT | Performed by: FAMILY MEDICINE

## 2018-10-16 RX ORDER — AZITHROMYCIN 250 MG/1
TABLET, FILM COATED ORAL
Qty: 6 TABLET | Refills: 0 | Status: SHIPPED | OUTPATIENT
Start: 2018-10-16

## 2018-10-16 NOTE — MR AVS SNAPSHOT
After Visit Summary   10/16/2018    Louie Osorio    MRN: [id number]           Patient Information     Date Of Birth          1950        Visit Information        Provider Department      10/16/2018 1:20 PM Estuardo Wilson MD [facility]        Today's Diagnoses     Acute bronchitis, unspecified organism    -  1    Tobacco use disorder        Need for prophylactic vaccination and inoculation against influenza          Care Instructions    Quitting smoking would be most important for you.     You could have COPD/emphysema and it would be very hard to treat without quitting smoking.     If you do not see any improvement - follow up.           Follow-ups after your visit        Who to contact     If you have questions or need follow up information about today's clinic visit or your schedule please contact [facility] directly at [phone].  Normal or non-critical lab and imaging results will be communicated to you by MyChart, letter or phone within 4 business days after the clinic has received the results. If you do not hear from us within 7 days, please contact the clinic through MyChart or phone. If you have a critical or abnormal lab result, we will notify you by phone as soon as possible.  Submit refill requests through [facility] or call your pharmacy and they will forward the refill request to us. Please allow 3 business days for your refill to be completed.          Additional Information About Your Visit        Care EveryWhere ID     This is your Care EveryWhere ID. This could be used by other organizations to access your [facility] medical records  [id number]        Your Vitals Were     Pulse Temperature Respirations Height Pulse Oximetry BMI (Body Mass Index)    86 98.5  F (36.9  C) (Oral) 16 6' (1.829 m) 97% 31.26 kg/m2       Blood Pressure from Last 3 Encounters:   10/16/18 122/86   07/16/18 119/88   12/05/17 139/87    Weight from Last 3  Encounters:   10/16/18 230 lb 8 oz (104.6 kg)   07/16/18 220 lb (99.8 kg)   12/05/17 225 lb 4 oz (102.2 kg)              We Performed the Following     FLU VACCINE, INCREASED ANTIGEN, PRESV FREE, AGE 65+ [30346]     Vaccine Administration, Initial [02749]          Today's Medication Changes          These changes are accurate as of 10/16/18  1:41 PM.  If you have any questions, ask your nurse or doctor.               Start taking these medicines.        Dose/Directions    azithromycin 250 MG tablet   Commonly known as:  ZITHROMAX   Used for:  Acute bronchitis, unspecified organism   Started by:  Estuardo Wilson MD        Two tablets first day, then one tablet daily for four days.   Quantity:  6 tablet   Refills:  0            Where to get your medicines      These medications were sent to [facility] [place] [address]  [address] [facility] [address]     Phone:  [phone]     azithromycin 250 MG tablet                Primary Care Provider Office Phone # Fax #    Estuardo Wilson [phone]       [address]  [facility] [address]        Equal Access to Services     SWATHI VALENCIA AH: Hadii yudy Abreu, waaxda lukareemadaha, qaybta kaalmada adecarmelyada, los marrufo. So [facility] [phone].    ATENCIÓN: Si habla español, tiene a moore disposición servicios gratuitos de asistencia lingüística. Nellyame al [phone].    We comply with applicable federal civil rights laws and [place] laws. We do not discriminate on the basis of race, color, national origin, age, disability, sex, sexual orientation, or gender identity.            Thank you!     Thank you for choosing [facility]  for your care. Our goal is always to provide you with excellent care. Hearing back from our patients is one way we can continue to improve our services. Please take a few minutes to complete the written survey that you may  receive in the mail after your visit with us. Thank you!             Your Updated Medication List - Protect others around you: Learn how to safely use, store and throw away your medicines at www.disposemymeds.org.          This list is accurate as of 10/16/18  1:41 PM.  Always use your most recent med list.                   Brand Name Dispense Instructions for use Diagnosis    azithromycin 250 MG tablet    ZITHROMAX    6 tablet    Two tablets first day, then one tablet daily for four days.    Acute bronchitis, unspecified organism       EQL FIBER SUPPLEMENT PO      Take 3 capsules by mouth daily as needed Psyllium, collinsonia powder, apple pectin, fennel, and fenugreek.  Uses once a week at the most for constipation.        fish oil-omega-3 fatty acids 1000 MG capsule      Take 2 g by mouth daily        fluticasone 50 MCG/ACT spray    FLONASE    1 Bottle    Spray 1-2 sprays into both nostrils daily    Post-nasal drainage       Glucosamine Sulfate 750 MG Caps      Take 1 capsule by mouth daily        * HERBALS      Take 1 each by mouth daily as needed (cold) Minus sinus includes - spearmint leafe, nettle leaf, mullein leaf, elderflowers, calendula, thyme, stevia. Only uses when he has a cold.        * HERBALS      Take 1 each by mouth daily as needed (doesnt drink every day) Sweet and spicy tea: rooibos, chicory root, rosehip, cinnamon, lemon grass, peppermint, chamomile, ginger root, anise seed, orange oil.        magnesium 250 MG tablet      Take 1 tablet by mouth daily        propranolol 20 MG tablet    INDERAL    180 tablet    Take 1 tablet (20 mg) by mouth 3 times daily as needed    Benign familial tremor       T-RELIEF PAIN ARNICA + 12 TAB & OINT Thpk      1 Application by * route daily as needed (back pain and pain in general) Includes aconitum, arnica, belladonna, bellis perennis, calendula, officinalis, chamomilla, echinacea, hamamelis, hypericum, millefolium, wesley graveolens, symphytum officinale         Vitamin D (Cholecalciferol) 1000 units Tabs      Take 1,000 Units by mouth daily        * Notice:  This list has 2 medication(s) that are the same as other medications prescribed for you. Read the directions carefully, and ask your doctor or other care provider to review them with you.

## 2018-10-16 NOTE — PROGRESS NOTES
SUBJECTIVE:   Louie Osorio is a 67 year old male who presents to clinic today for the following health issues:      Acute Illness   Acute illness concerns: cold   Onset: months    Fever: no    Chills/Sweats: no    Headache (location?): no    Sinus Pressure:YES- post-nasal drainage    Conjunctivitis:  no    Ear Pain: no    Rhinorrhea: YES    Congestion: YES    Sore Throat: no     Cough: YES-productive of clear sputum    Wheeze: YES    Decreased Appetite: no    Nausea: no    Vomiting: no    Diarrhea:  no    Dysuria/Freq.: no    Fatigue/Achiness: no    Sick/Strep Exposure: no     Therapies Tried and outcome: flonase and cough drop    No sick contact.  No recent travel. No fever.  More sinus congestion and some drainage. Some mucus production.     Smoking for 40-50 yrs or so. Quit on and off for short period of time. Quit for 10 yr the most.     Problem list and histories reviewed & adjusted, as indicated.  Additional history: as documented    Labs reviewed in EPIC    Reviewed and updated as needed this visit by clinical staff       Reviewed and updated as needed this visit by Provider           Social History     Social History     Marital status: Single     Spouse name: N/A     Number of children: 0     Years of education: N/A     Occupational History      -  [employer]     Social History Main Topics     Smoking status: Current Some Day Smoker     Packs/day: 1.00     Years: 42.00     Types: Cigarettes     Smokeless tobacco: Never Used      Comment: Smoker - <1 ppd started at 17 years     Alcohol use Yes      Comment: 4-5 drinks a week      Drug use: No     Sexual activity: Yes     Partners: Female     Birth control/ protection: Abstinence     Other Topics Concern      Service No     Blood Transfusions No     Caffeine Concern No     Occupational Exposure Yes     Hobby Hazards No     Sleep Concern Yes     can't skeep well at night     Stress Concern  No     Weight Concern Yes     pt is not happy with weight     Special Diet No     Back Care No     Exercise Yes     sometimes     Bike Helmet Yes     pt does ride a bike w/helmet     Seat Belt Yes     Self-Exams No     Parent/Sibling W/ Cabg, Mi Or Angioplasty Before 65f 55m? No     Social History Narrative     No Known Allergies  Patient Active Problem List   Diagnosis     Benign familial tremor     Tobacco use disorder     Reviewed medications, social history and  past medical and surgical history.    Review of system: for general, respiratory, CVS, GI and psychiatry negative except for noted above.     EXAM:  /86 (BP Location: Right arm, Patient Position: Sitting, Cuff Size: Adult Large)  Pulse 86  Temp 98.5  F (36.9  C) (Oral)  Resp 16  Ht 6' (1.829 m)  Wt 230 lb 8 oz (104.6 kg)  SpO2 97%  BMI 31.26 kg/m2  Constitutional: healthy, alert and no distress   Psychiatric: mentation appears normal and affect normal/bright  Neuro: Coarse tremor and slightly paused speech  Cardiovascular: RRR. No murmurs,  Respiratory: negative, Lungs clear. No crackles or wheezing. No tachypnea.   Head: Normocephalic. No masses, lesions, tenderness or abnormalities  Neck: Neck supple. No adenopathy.    ENT: Both TM exam normal, no neck nodes or sinus tenderness, mild nasal turbinate hypertrophy, throat clear    ASSESSMENT / PLAN:  (J20.9) Acute bronchitis, unspecified organism  (primary encounter diagnosis)  Comment: With ongoing smoking.  We discussed about quitting smoking completely as I cannot rule out COPD completely.  He is going to work on it but not ready to quit yet.  At this point I am going to treat it like a bacterial bronchitis and treated with antibiotics.  We discussed about side effects.  Plan: azithromycin (ZITHROMAX) 250 MG tablet             (F17.200) Tobacco use disorder  Comment:    Plan: As mentioned above COPD cannot be ruled out.  We need to do further evaluation if symptoms are persistent.    (Z23)  Need for prophylactic vaccination and inoculation against influenza  Comment:    Plan: FLU VACCINE, INCREASED ANTIGEN, PRESV FREE, AGE        65+ [42889], Vaccine Administration, Initial         [26467]             (Z23) Encounter for immunization  Comment:    Plan: PNEUMOCOCCAL CONJ VACCINE 13 VALENT IM             Follow up: See patient instructions.    The above note was dictated using voice recognition. Although reviewed after completion, some word and grammatical error may remain .      Patient Instructions   Quitting smoking would be most important for you.     You could have COPD/emphysema and it would be very hard to treat without quitting smoking.     If you do not see any improvement - follow up.             Injectable Influenza Immunization Documentation    1.  Is the person to be vaccinated sick today?  Yes    2. Does the person to be vaccinated have an allergy to a component   of the vaccine?   No  Egg Allergy Algorithm Link    3. Has the person to be vaccinated ever had a serious reaction   to influenza vaccine in the past?   No    4. Has the person to be vaccinated ever had Guillain-Barré syndrome?   No    Form completed by Berna [facility]

## 2018-10-16 NOTE — PATIENT INSTRUCTIONS
Quitting smoking would be most important for you.     You could have COPD/emphysema and it would be very hard to treat without quitting smoking.     If you do not see any improvement - follow up.

## 2020-05-14 ENCOUNTER — HOSPITAL ENCOUNTER (EMERGENCY)
Facility: CLINIC | Age: 70
Discharge: HOME | End: 2020-05-14
Payer: COMMERCIAL

## 2020-05-14 VITALS
HEART RATE: 97 BPM | OXYGEN SATURATION: 93 % | BODY MASS INDEX: 31.26 KG/M2 | HEIGHT: 72 IN | TEMPERATURE: 98.6 F | DIASTOLIC BLOOD PRESSURE: 62 MMHG | RESPIRATION RATE: 18 BRPM | SYSTOLIC BLOOD PRESSURE: 141 MMHG

## 2020-05-14 NOTE — ED NOTES
Pt states he doesn't want to be seen for itchy eyes just to get tested for COVID - pt denies any symptoms - instructed pt to follow up with PMD and numbers to call to be tested

## 2020-05-14 NOTE — ED PROVIDER NOTES
History     Chief Complaint:  ***  Eye Itching Both Eyes      HPI   Louie Osorio is a 69 year old male who presents with ***    Allergies:  No Known Drug Allergies     Medications:    Propanolol     Past Medical History:    Tobacco use disorder  Tremors, familial     Past Surgical History:    [place] teeth extraction  Colonoscopy   Cholecystectomy      Family History:    Mother - thyroid disease     Social History:  The patient was accompanied to the ED by ***.  Smoking Status: Current some day smoker 1.00 PPD for 42 years  Smokeless Tobacco: Never  Alcohol Use: Yes    Marital Status:  Single      Review of Systems  ***    Physical Exam     Patient Vitals for the past 24 hrs:   BP Temp Temp src Pulse Resp SpO2 Height   05/14/20 1049 (!) 141/62 98.6  F (37  C) Oral 97 18 93 % 1.829 m (6')       Physical Exam  ***    Emergency Department Course     ECG:  ***     Imaging:  Radiology findings were communicated with the {Patient/MD:[id number]} who voiced understanding of the findings.    No orders to display       Laboratory:  Laboratory findings were communicated with the {Patient/MD:[id number]} who voiced understanding of the findings.    ***     Procedures  ***    Interventions:  ***    Medications - No data to display    Emergency Department Course:  Past medical records, nursing notes, and vitals reviewed.    *** I performed an exam of the patient as documented above.     ***EKG obtained in the ED, see results above.   ***IV was inserted and blood was drawn for laboratory testing, results above.  ***The patient provided a urine sample here in the emergency department. This was sent for laboratory testing, findings above.  ***The patient was sent for a *** while in the emergency department, results above.     *** I rechecked the patient and discussed the results of his workup thus far.     {edcdispo:060373}    I personally reviewed the laboratory *** and imaging results with the {PATIENT, FAMILY MEMBER,  "CAREGIVER:[id number]} and answered all related questions prior to {kldischadmittrans:879840}.     Impression & Plan     ***     {[facility]:871043}    {trauma activation?:851392::\" \"}    CMS Diagnoses: {Sepsis/Septic Shock/Stemi/Stroke:403442::\" \"}    Medical Decision Making:  ***     Critical Care Time: was *** minutes for this patient excluding procedures    Diagnosis:  No diagnosis found.    Disposition:  {kldisposition:244479}    Discharge Medications:  New Prescriptions    No medications on file       ***   5/14/2020    EMERGENCY DEPARTMENT    "

## 2021-06-06 ENCOUNTER — HOSPITAL ENCOUNTER (EMERGENCY)
Facility: CLINIC | Age: 71
Discharge: HOME | End: 2021-06-06
Attending: EMERGENCY MEDICINE | Admitting: EMERGENCY MEDICINE
Payer: COMMERCIAL

## 2021-06-06 ENCOUNTER — APPOINTMENT (OUTPATIENT)
Dept: CT IMAGING | Facility: CLINIC | Age: 71
End: 2021-06-06
Attending: EMERGENCY MEDICINE
Payer: COMMERCIAL

## 2021-06-06 VITALS
DIASTOLIC BLOOD PRESSURE: 77 MMHG | SYSTOLIC BLOOD PRESSURE: 121 MMHG | OXYGEN SATURATION: 98 % | HEART RATE: 78 BPM | TEMPERATURE: 98.6 F | RESPIRATION RATE: 16 BRPM

## 2021-06-06 DIAGNOSIS — R41.0 CONFUSION: ICD-10-CM

## 2021-06-06 LAB
ALBUMIN SERPL-MCNC: 3.3 G/DL (ref 3.4–5)
ALP SERPL-CCNC: 67 U/L (ref 40–150)
ALT SERPL W P-5'-P-CCNC: 19 U/L (ref 0–70)
ANION GAP SERPL CALCULATED.3IONS-SCNC: 5 MMOL/L (ref 3–14)
AST SERPL W P-5'-P-CCNC: 19 U/L (ref 0–45)
BASOPHILS # BLD AUTO: 0.1 10E9/L (ref 0–0.2)
BASOPHILS NFR BLD AUTO: 0.7 %
BILIRUB SERPL-MCNC: 0.9 MG/DL (ref 0.2–1.3)
BUN SERPL-MCNC: 11 MG/DL (ref 7–30)
CALCIUM SERPL-MCNC: 8.8 MG/DL (ref 8.5–10.1)
CHLORIDE SERPL-SCNC: 109 MMOL/L (ref 94–109)
CK SERPL-CCNC: 76 U/L (ref 30–300)
CO2 SERPL-SCNC: 27 MMOL/L (ref 20–32)
CREAT SERPL-MCNC: 1.06 MG/DL (ref 0.66–1.25)
DIFFERENTIAL METHOD BLD: NORMAL
EOSINOPHIL # BLD AUTO: 0.2 10E9/L (ref 0–0.7)
EOSINOPHIL NFR BLD AUTO: 2.4 %
ERYTHROCYTE [DISTWIDTH] IN BLOOD BY AUTOMATED COUNT: 13.4 % (ref 10–15)
ETHANOL SERPL-MCNC: <0.01 G/DL
GFR SERPL CREATININE-BSD FRML MDRD: 71 ML/MIN/{1.73_M2}
GLUCOSE SERPL-MCNC: 112 MG/DL (ref 70–99)
HCT VFR BLD AUTO: 43.9 % (ref 40–53)
HGB BLD-MCNC: 14.3 G/DL (ref 13.3–17.7)
IMM GRANULOCYTES # BLD: 0 10E9/L (ref 0–0.4)
IMM GRANULOCYTES NFR BLD: 0.3 %
INTERPRETATION ECG - MUSE: NORMAL
LYMPHOCYTES # BLD AUTO: 2.6 10E9/L (ref 0.8–5.3)
LYMPHOCYTES NFR BLD AUTO: 38.6 %
MAGNESIUM SERPL-MCNC: 2.3 MG/DL (ref 1.6–2.3)
MCH RBC QN AUTO: 30.7 PG (ref 26.5–33)
MCHC RBC AUTO-ENTMCNC: 32.6 G/DL (ref 31.5–36.5)
MCV RBC AUTO: 94 FL (ref 78–100)
MONOCYTES # BLD AUTO: 0.8 10E9/L (ref 0–1.3)
MONOCYTES NFR BLD AUTO: 11.7 %
NEUTROPHILS # BLD AUTO: 3.1 10E9/L (ref 1.6–8.3)
NEUTROPHILS NFR BLD AUTO: 46.3 %
NRBC # BLD AUTO: 0 10*3/UL
NRBC BLD AUTO-RTO: 0 /100
PLATELET # BLD AUTO: 194 10E9/L (ref 150–450)
POTASSIUM SERPL-SCNC: 3.6 MMOL/L (ref 3.4–5.3)
PROT SERPL-MCNC: 6.8 G/DL (ref 6.8–8.8)
RBC # BLD AUTO: 4.66 10E12/L (ref 4.4–5.9)
SODIUM SERPL-SCNC: 141 MMOL/L (ref 133–144)
TROPONIN I SERPL-MCNC: <0.015 UG/L (ref 0–0.04)
WBC # BLD AUTO: 6.7 10E9/L (ref 4–11)

## 2021-06-06 PROCEDURE — 93005 ELECTROCARDIOGRAM TRACING: CPT

## 2021-06-06 PROCEDURE — 96374 THER/PROPH/DIAG INJ IV PUSH: CPT

## 2021-06-06 PROCEDURE — 96361 HYDRATE IV INFUSION ADD-ON: CPT

## 2021-06-06 PROCEDURE — 99285 EMERGENCY DEPT VISIT HI MDM: CPT | Mod: 25

## 2021-06-06 PROCEDURE — 82550 ASSAY OF CK (CPK): CPT | Performed by: EMERGENCY MEDICINE

## 2021-06-06 PROCEDURE — 82077 ASSAY SPEC XCP UR&BREATH IA: CPT | Performed by: EMERGENCY MEDICINE

## 2021-06-06 PROCEDURE — 85025 COMPLETE CBC W/AUTO DIFF WBC: CPT | Performed by: EMERGENCY MEDICINE

## 2021-06-06 PROCEDURE — 258N000003 HC RX IP 258 OP 636: Performed by: EMERGENCY MEDICINE

## 2021-06-06 PROCEDURE — 250N000011 HC RX IP 250 OP 636: Performed by: EMERGENCY MEDICINE

## 2021-06-06 PROCEDURE — 70450 CT HEAD/BRAIN W/O DYE: CPT

## 2021-06-06 PROCEDURE — 83735 ASSAY OF MAGNESIUM: CPT | Performed by: EMERGENCY MEDICINE

## 2021-06-06 PROCEDURE — 84484 ASSAY OF TROPONIN QUANT: CPT | Performed by: EMERGENCY MEDICINE

## 2021-06-06 PROCEDURE — 80053 COMPREHEN METABOLIC PANEL: CPT | Performed by: EMERGENCY MEDICINE

## 2021-06-06 RX ORDER — HALOPERIDOL 5 MG/ML
2 INJECTION INTRAMUSCULAR ONCE
Status: COMPLETED | OUTPATIENT
Start: 2021-06-06 | End: 2021-06-06

## 2021-06-06 RX ORDER — SODIUM CHLORIDE 9 MG/ML
INJECTION, SOLUTION INTRAVENOUS CONTINUOUS
Status: DISCONTINUED | OUTPATIENT
Start: 2021-06-06 | End: 2021-06-06 | Stop reason: HOSPADM

## 2021-06-06 RX ADMIN — SODIUM CHLORIDE 1000 ML: 9 INJECTION, SOLUTION INTRAVENOUS at 16:59

## 2021-06-06 RX ADMIN — HALOPERIDOL LACTATE 2 MG: 5 INJECTION, SOLUTION INTRAMUSCULAR at 17:57

## 2021-06-06 ASSESSMENT — ENCOUNTER SYMPTOMS
ABDOMINAL PAIN: 0
EYE PAIN: 1
HEADACHES: 0

## 2021-06-06 NOTE — ED NOTES
Pt again attempted to get out of bed, EDT and additional RN assisted pt back to bed. He was in all of his clothes an d attempting to leave. EDT sitting at bedside at this time. Charge RN notified and will hopefully obtain a sitter for pt

## 2021-06-06 NOTE — ED TRIAGE NOTES
Pt presents by EMS after being found by PD and EMS for acute confusion. Pt was found to be wandering the streets, attempting to walk into people's home and attempting to get into people's cars therefore bystanders called EMS. Pt only orientated to self for EMS. Pt was wearing hat, carmelo jacket, jeans and boots on an extremely hot temperature day, temps roughly 100 degrees F. Pt states he was buying groceries and from where pt was found by PD and the grocery store pt states he was located at was approximately 25 blocks. Pt orientated to self in ED and year 2021 but nothing else. Pt follows commands, only able to answer basic questions that are yes/no questions. Pt blankly stares at staff if asked a detailed question. Denies HA, normal and equal strength in hands/legs and equal facial movements. Bilateral hand tremors noted. Pt denies any other complaints/pain. RN asked pt if she could call family/friends and pt states no and reports he does live alone. In demographics there is no family/friends listed and states the pt refused

## 2021-06-06 NOTE — ED NOTES
"Pt attempting to get out of bed, states he wants his shirt on and pulled off all his stickers. Pt just laughs at RN when she states he needs to stay in bed and cannot get up. Pt states he plans to leave and \"will come back\"  RN attempting to distract pt and re-orientate him.   "

## 2021-06-06 NOTE — ED NOTES
"Pt remains confused, is restless and appears agitated. Pt attempting to pull out PIV and states \"get that out of there\" and pulled of his ID band. Pt re-orientated and given call light. Pt just continuously flipping channels and not watching TV   "

## 2021-06-06 NOTE — ED NOTES
Pt tucked in with warm blankets, assisted back into lay position in bed and lights dimmed. Security standing outside of doorway . Side rails up and call light within reach.

## 2021-06-06 NOTE — ED PROVIDER NOTES
History   Chief Complaint:  Altered Mental Status       The history is provided by the police (nurse). The history is limited by the condition of the patient.      Louie Osorio is a 70 year old male who presents with altered mental status. The nurse at [facility] reports he lives in an independent home and only had one outing today to the grocery store. The grocery store was about 25 blocks point EMS picked him from. He was atempting to enter people's home and entering their backyards. He denies abdominal pain and headache. He mentions some ear pain. He denies drinking alcohol.     History and review of systems limited by patient's altered mental status.    Review of Systems   Unable to perform ROS: Mental status change   Eyes: Positive for pain.   Gastrointestinal: Negative for abdominal pain.   Neurological: Negative for headaches.     Allergies:  No Known Drug Allergies    Medications:  azithromycin  Corn Dextrin   Glucosamine Sulfate   Homeopathic Products   magnesium   propranolol    Past Medical History:    Tremors  Benign familial tremor  Tobacco use disorder    Past Surgical History:    [place] teeth extraction  Colonoscopy  HC removal gallbladder      Family History:    Thyroid disease     Social History:  Patient presents alone.   Patient lives alone.    Physical Exam     Patient Vitals for the past 24 hrs:   BP Temp Temp src Pulse Resp SpO2   06/06/21 2053 121/77 -- -- 78 16 98 %   06/06/21 1750 -- -- -- 72 21 --   06/06/21 1735 -- -- -- 73 18 97 %   06/06/21 1710 -- -- -- 78 19 96 %   06/06/21 1700 112/74 -- -- 81 10 94 %   06/06/21 1646 106/74 98.6  F (37  C) Oral 88 20 98 %       Physical Exam  General: Well-nourished, appears to be resting comfortably when I enter the room  Eyes: PERRL, conjunctivae pink no scleral icterus or conjunctival injection  ENT:  Moist mucus membranes, posterior oropharynx clear without erythema or exudates  Respiratory:  Lungs clear to auscultation bilaterally, no  crackles/rubs/wheezes.  Good air movement  CV: Normal rate and rhythm, no murmurs/rubs/gallops  GI:  Abdomen soft and non-distended.  Normoactive BS.  No tenderness, guarding or rebound  Skin: Warm, dry.  No rashes or petechiae  Musculoskeletal: No peripheral edema or calf tenderness  Neuro: Alert and oriented to person but not place/time.  Is able to tell me his name, his birthdate as well as his address.  +bilateral tremor. PERRL, EOMI no nystagmus, no aphasia/facial droop/dysarthria, tongue midline, symmetric palatal elevation, normal strength at SCM/trapezius/BUE/BLE, normal coordination to FNF at BUE,gait deferred, negative romberg, sensation intact to LT over face/BUE/BLE  Psychiatric:Flat affect      Emergency Department Course   ECG  ECG taken at 1644, ECG read at 1644  Sinus rhythm with premature atrial complexes  Left anterior fascicular block   Abnormal ECG   Rate 84 bpm. IL interval 178 ms. QRS duration 104 ms. QT/QTc 394/465 ms. P-R-T axes 51 -51 72.     Imaging:  CTA Head without contrast:   1. No acute intracranial hemorrhage, mass lesion or herniation.  2. Mild generalized brain parenchymal volume loss, with superimposed  disproportionate marked atrophy of the anterior medial right temporal  lobe and to a lesser degree the left medial temporal lobe, with ex  vacuo dilatation of the right more than left temporal horns of the  lateral ventricles. Please see the body of report for additional  details.  3. Mild presumed chronic small vessel ischemic disease.  As per radiology.    Laboratory:  CBC: WBC 6.7, HGB 14.3,    CMP: Glucose 112 (H), Albumin 3.3 (L),  o/w WNL (Creatinine 1.06)   CK total: 76  Magnesium: 2.3  Troponin I (1657): <0.015  Alcohol Level Blood: < 0.01    Emergency Department Course:    Reviewed:  I reviewed nursing notes, vitals and past medical history    Assessments:  1643 I obtained history and examined the patient as noted above.   2010 I rechecked the patient and explained  findings. Patient is safe for discharge      Consults:   180 I consulted with patient's emergency contact, Kaleb Garber.     Interventions:  1659 NS 1L IV  1757 Haldol 2mg IV    Disposition:  The patient was discharged to home.       Impression & Plan     Medical Decision Making:  Louie Osorio is a 70 year old male who was found wandering and lost after going to the grocery store.  He seemed to be over dressed for the heat.  He does not appear to be hyperthermic.  He does not appear to be intoxicated.  Laboratory studies were all reassuring.  We did obtain a head CT to rule out a bleed or other intracranial catastrophe.  This showed findings that could be consistent with Alzheimer's dementia.  I was able to get in touch with his brother-in-law who is his emergency contact.  His brother-in-law states that dementia runs in their family and his wife  of this.  He reports that over the past few months there have been concerns about the possibility that he is developing dementia himself but he is already has been able to live independently.  They actually have an appointment set up with a neurologist for him.  He is able to go home to his apartment with him and stay with him.  The patient normally takes the bus and does not drive.  The patient wishes to be discharged home in the care of his brother-in-law and does not wish to be admitted to the hospital.  They feel they have the resources that they needed at home as well as a follow-up care that they need with neurology.  The patient is otherwise well-appearing and without signs of trauma or acute infection or illness.  I do not believe he is holdable.  At this time, with reasonable clinical confidence, I do feel safe for discharge home to the care of his brother-in-law.    Diagnosis:    ICD-10-CM    1. Confusion  R41.0        Scribe Disclosure:  I, Lori Dukes, am serving as a scribe at 4:43 PM on 2021 to document services personally performed by Kait Campbell  MD based on my observations and the provider's statements to me.           Kait Campbell MD  06/07/21 0252

## 2021-06-07 NOTE — DISCHARGE INSTRUCTIONS
*You may resume diet and activities.  *Follow-up with neurology as planned.  *Return if you become worse in any way.

## 2021-06-15 ENCOUNTER — APPOINTMENT (OUTPATIENT)
Dept: GENERAL RADIOLOGY | Facility: CLINIC | Age: 71
End: 2021-06-15
Attending: EMERGENCY MEDICINE
Payer: COMMERCIAL

## 2021-06-15 ENCOUNTER — HOSPITAL ENCOUNTER (EMERGENCY)
Facility: CLINIC | Age: 71
Discharge: HOME | End: 2021-06-15
Attending: EMERGENCY MEDICINE | Admitting: EMERGENCY MEDICINE
Payer: COMMERCIAL

## 2021-06-15 VITALS
DIASTOLIC BLOOD PRESSURE: 90 MMHG | HEART RATE: 86 BPM | SYSTOLIC BLOOD PRESSURE: 147 MMHG | TEMPERATURE: 97.5 F | RESPIRATION RATE: 16 BRPM | OXYGEN SATURATION: 99 %

## 2021-06-15 DIAGNOSIS — R41.0 CONFUSION: ICD-10-CM

## 2021-06-15 DIAGNOSIS — F03.91 DEMENTIA WITH BEHAVIORAL DISTURBANCE, UNSPECIFIED DEMENTIA TYPE: ICD-10-CM

## 2021-06-15 LAB
ALBUMIN UR-MCNC: 10 MG/DL
ANION GAP SERPL CALCULATED.3IONS-SCNC: 4 MMOL/L (ref 3–14)
APPEARANCE UR: CLEAR
BASOPHILS # BLD AUTO: 0.1 10E9/L (ref 0–0.2)
BASOPHILS NFR BLD AUTO: 0.7 %
BILIRUB UR QL STRIP: NEGATIVE
BUN SERPL-MCNC: 9 MG/DL (ref 7–30)
CALCIUM SERPL-MCNC: 8.5 MG/DL (ref 8.5–10.1)
CHLORIDE SERPL-SCNC: 112 MMOL/L (ref 94–109)
CO2 SERPL-SCNC: 25 MMOL/L (ref 20–32)
COLOR UR AUTO: YELLOW
CREAT SERPL-MCNC: 0.89 MG/DL (ref 0.66–1.25)
DIFFERENTIAL METHOD BLD: NORMAL
EOSINOPHIL # BLD AUTO: 0.2 10E9/L (ref 0–0.7)
EOSINOPHIL NFR BLD AUTO: 2.4 %
ERYTHROCYTE [DISTWIDTH] IN BLOOD BY AUTOMATED COUNT: 13.4 % (ref 10–15)
ETHANOL SERPL-MCNC: <0.01 G/DL
GFR SERPL CREATININE-BSD FRML MDRD: 87 ML/MIN/{1.73_M2}
GLUCOSE SERPL-MCNC: 103 MG/DL (ref 70–99)
GLUCOSE UR STRIP-MCNC: NEGATIVE MG/DL
HCT VFR BLD AUTO: 42.2 % (ref 40–53)
HGB BLD-MCNC: 14.1 G/DL (ref 13.3–17.7)
HGB UR QL STRIP: NEGATIVE
IMM GRANULOCYTES # BLD: 0 10E9/L (ref 0–0.4)
IMM GRANULOCYTES NFR BLD: 0.3 %
KETONES UR STRIP-MCNC: 5 MG/DL
LABORATORY COMMENT REPORT: NORMAL
LEUKOCYTE ESTERASE UR QL STRIP: NEGATIVE
LYMPHOCYTES # BLD AUTO: 2.3 10E9/L (ref 0.8–5.3)
LYMPHOCYTES NFR BLD AUTO: 32.4 %
MCH RBC QN AUTO: 31.5 PG (ref 26.5–33)
MCHC RBC AUTO-ENTMCNC: 33.4 G/DL (ref 31.5–36.5)
MCV RBC AUTO: 94 FL (ref 78–100)
MONOCYTES # BLD AUTO: 0.7 10E9/L (ref 0–1.3)
MONOCYTES NFR BLD AUTO: 9.1 %
MUCOUS THREADS #/AREA URNS LPF: PRESENT /LPF
NEUTROPHILS # BLD AUTO: 3.9 10E9/L (ref 1.6–8.3)
NEUTROPHILS NFR BLD AUTO: 55.1 %
NITRATE UR QL: NEGATIVE
NRBC # BLD AUTO: 0 10*3/UL
NRBC BLD AUTO-RTO: 0 /100
PH UR STRIP: 5.5 PH (ref 5–7)
PLATELET # BLD AUTO: 162 10E9/L (ref 150–450)
POTASSIUM SERPL-SCNC: 4.4 MMOL/L (ref 3.4–5.3)
RBC # BLD AUTO: 4.48 10E12/L (ref 4.4–5.9)
RBC #/AREA URNS AUTO: <1 /HPF (ref 0–2)
SARS-COV-2 RNA RESP QL NAA+PROBE: NEGATIVE
SODIUM SERPL-SCNC: 141 MMOL/L (ref 133–144)
SOURCE: ABNORMAL
SP GR UR STRIP: 1.03 (ref 1–1.03)
SPECIMEN SOURCE: NORMAL
SQUAMOUS #/AREA URNS AUTO: 2 /HPF (ref 0–1)
UROBILINOGEN UR STRIP-MCNC: 0 MG/DL (ref 0–2)
WBC # BLD AUTO: 7.1 10E9/L (ref 4–11)
WBC #/AREA URNS AUTO: 0 /HPF (ref 0–5)

## 2021-06-15 PROCEDURE — 71046 X-RAY EXAM CHEST 2 VIEWS: CPT

## 2021-06-15 PROCEDURE — 80048 BASIC METABOLIC PNL TOTAL CA: CPT | Performed by: EMERGENCY MEDICINE

## 2021-06-15 PROCEDURE — 81001 URINALYSIS AUTO W/SCOPE: CPT | Performed by: EMERGENCY MEDICINE

## 2021-06-15 PROCEDURE — 85025 COMPLETE CBC W/AUTO DIFF WBC: CPT | Performed by: EMERGENCY MEDICINE

## 2021-06-15 PROCEDURE — 82077 ASSAY SPEC XCP UR&BREATH IA: CPT | Performed by: EMERGENCY MEDICINE

## 2021-06-15 PROCEDURE — C9803 HOPD COVID-19 SPEC COLLECT: HCPCS

## 2021-06-15 PROCEDURE — 99284 EMERGENCY DEPT VISIT MOD MDM: CPT | Mod: 25

## 2021-06-15 PROCEDURE — 87635 SARS-COV-2 COVID-19 AMP PRB: CPT | Performed by: EMERGENCY MEDICINE

## 2021-06-15 NOTE — DISCHARGE INSTRUCTIONS
CT SCAN OF THE HEAD WITHOUT CONTRAST   6/6/2021 5:26 PM      HISTORY: Mental status change, unknown cause.     TECHNIQUE:  Axial images of the head and coronal reformations without  IV contrast material. Radiation dose for this scan was reduced using  automated exposure control, adjustment of the mA and/or kV according  to patient size, or iterative reconstruction technique.     COMPARISON: None.     FINDINGS: No acute intracranial hemorrhage identified. No extra axial  fluid collection, mass lesion or mass effect/herniation. There is mild  generalized brain parenchymal volume loss, with superimposed marked  atrophy of the anterior medial right temporal lobe and to a lesser  degree the left medial temporal lobe, with ex vacuo dilatation of the  right more than left temporal horns of the lateral ventricles. This  pattern is nonspecific, but can be seen in patients with Alzheimer's  disease in the appropriate clinical context. There are no definite  extended signs of acute infarct. Mild patchy hypoattenuation in the  cerebral white matter is nonspecific, but most likely reflects chronic  small vessel ischemic disease.     There is a mucous retention cyst or polyp in the left maxillary sinus  along the medial wall. Mild scattered paranasal sinus mucosal  thickening is seen elsewhere. There is no definite acute abnormality  of the visualized orbits. The mastoid and middle ear cavities appear  clear where visualized.                                                                      IMPRESSION:  1. No acute intracranial hemorrhage, mass lesion or herniation.  2. Mild generalized brain parenchymal volume loss, with superimposed  disproportionate marked atrophy of the anterior medial right temporal  lobe and to a lesser degree the left medial temporal lobe, with ex  vacuo dilatation of the right more than left temporal horns of the  lateral ventricles. Please see the body of report for additional  details.  3. Mild  presumed chronic small vessel ischemic disease.

## 2021-06-15 NOTE — ED NOTES
Bed: ED17  Expected date:   Expected time:   Means of arrival:   Comments:  Hems [address] 70M confusion ON HOLD eta 1116

## 2021-06-15 NOTE — ED NOTES
Patient got dressed by himself and is attempting to leave with his belongings. Patient informed that brother-in-law is coming to ED soon (Per Rosy SUN, brother will be here before 1600). Patient insistent on leaving and attempting to open doors to elope. Patient does not demonstrate any evidence of learning. MD requested patient be put on a hold due to mental status and being unable to care for self.

## 2021-06-15 NOTE — ED NOTES
Pt presents to the ED via [facility] for evaluation of confusion. Per EMS, recently seen at [facility] ED for confusion. Today, pt was wandering around parking lot and attempting to get into people's car. Pt disoriented x3 on arrival to ED.   Pre-hospital blood sugar: 116.

## 2021-06-15 NOTE — ED PROVIDER NOTES
History   Chief Complaint:  Confusion       The history is provided by the patient. History limited by: jia gravesian.   History supplemented by electronic chart review     Louie Osorio is a 70 year old male with a history of familial tremor who presents via EMS for evaluation of confusion. Of note, the patient was recently seen here 06/06/2021 for confusion thought to be most likely related to previously undiagnosed dementia, with unremarkable work-up in the emergency department including head CT, and was ultimately discharged home to his Brother-in-law. EMS reports that patient was found in a [employer] parking lot this morning, pulling on random car doors, though without apparent malicious or criminal intent.  The patient states that he does not drive any more. He is unsure why he is in the ED. He denies being in any pain, further denies any bladder incontinence, fever, cough, or vomiting. He does smoke tobacco, and also endorses occasional alcohol use but none today. The patient currently lives alone in a house.    Review of Systems   Reason unable to perform ROS: jia gravesian.     Allergies:  No Known Drug Allergies    Medications:  Propranolol  Vitamin D    Past Medical History:    Benign familial tremor  Tobacco use disorder    Past Surgical History:    [place] teeth extraction  Colonoscopy  Cholecystectomy    Family History:    Strong family history of dementia, according to his brother-in-law who arrived later in the emergency department    Social History:  The patient was accompanied to the ED by EMS.  PCP: Estuardo Wilson MD  Smoking Status: Positive  Alcohol Use: Occasional    Physical Exam     Patient Vitals for the past 24 hrs:   BP Temp Temp src Pulse Resp SpO2   06/15/21 1600 -- -- -- -- -- 99 %   06/15/21 1400 (!) 147/90 -- -- -- -- 96 %   06/15/21 1127 (!) 126/97 97.5  F (36.4  C) Temporal 86 16 93 %       Physical Exam  General: Nontoxic-appearing male sitting upright in room 17,  pack of cigarettes in his shirt pocket, wearing hat  HENT: mucous membranes moist  CV: rate as above, regular rhythm  Resp: normal effort, speaks in full phrases, no stridor, no cough observed  GI: abdomen soft and nontender, no guarding  MSK: no bony tenderness  Skin: appropriately warm and dry  Neuro: awake, alert, clear speech, thinks it is 1920, follows basic commands without difficulty,, face symmetric,  normal, strength and sensation intact in all extr, no nuchal rigidity, ambulates with steady independent gait  Psych: Cooperative with basic cares, no evidence of hallucinations, denies feeling suicidal or homicidal      Emergency Department Course     Imaging:  XR Chest 2 Views:  Mild interstitial prominence in the lung bases, nonspecific, possibly related to low lung volumes. No focal infiltrate or pleural effusion.  Report per radiology.    Laboratory:  CBC: WBC 7.1, HGB 14.1,   BMP: Chloride 112 (H), Glucose 103 (H), o/w WNL (Creatinine 0.89)    Alcohol ethyl: <0.01    UA: Ketones 5 (A), Protein albumin 10 (A), Squamous epithelial/HPF 2 (H), Mucous present (A), o/w WNL     Asymptomatic SARS-CoV2 (COVID-19) Virus: Negative    Emergency Department Course:    Reviewed:  I reviewed the patient's nursing notes, vitals, past medical history and care everywhere.     Assessments:  1145 I performed an exam of the patient in room 17 as documented above.  1304 Patient rechecked and updated.    1539 I rechecked the patient and updated his family on the plan. Patient's brother-in-law, Kaleb, agrees to take patient home.    Consults:    1207 I spoke with Rosy, ED care manager, regarding patient's presentation, findings, and plan of care.  1307 I spoke again with Rosy regarding patient's plan of care.     Disposition:  The patient was discharged to home in the care of brother-in-law.     Impression & Plan   Medical Decision Making:  His pattern of recent behavior is suggestive of underlying dementia with  behavioral disturbances, though I certainly considered the possibility of alternate or additional conditions such as stroke, metabolic disturbance, infection, and many others.  I reviewed his recent emergency department visit and extensive work-up at that time.  Ultimately, his work-up here is reassuring, but I do have concerns for his safety.  With the help of Rosy, her ED care manager, we were able to get in touch with the patient's brother-in-law, his next of kin, who came to the emergency department and was provided with resources to help facilitate a higher level of care in the near future.  His family will stay with him in the meantime.  I do not think he can safely reside on his own anymore.  However, given family member presence and willingness to participate close in his care, I do not think he must be hospitalized at this time.  He does not have any pain.  Vitals are satisfactory.  Return for crisis at any hour.  He was discharged in the care of family.    Covid-19  Louie Osorio was evaluated during a global COVID-19 pandemic, which necessitated consideration that the patient might be at risk for infection with the SARS-CoV-2 virus that causes COVID-19. Applicable protocols for evaluation were followed during the patient's care. COVID-19 was considered as part of the patient's evaluation. A test was obtained during this visit.    Diagnosis:    ICD-10-CM    1. Confusion  R41.0    2. Dementia with behavioral disturbance, unspecified dementia type (H)  F03.91     suspected     Scribe Disclosure:  I, Glenna Muse, am serving as a scribe at 11:45 AM on 6/15/2021 to document services personally performed by Aung Foley MD based on my observations and the provider's statements to me.     This note was completed in part using Dragon voice recognition software. Although reviewed after completion, some word and grammatical errors may occur.     Glenna Muse  6/15/2021   [facility]  EMERGENCY DEPARTMENT         Aung Foley MD  06/15/21 1941

## 2021-06-15 NOTE — ED NOTES
Patient requesting to use urinal. RN noticed patient removed IV by self, bleeding controlled. Assisted with urinal, unable to void.

## 2021-06-15 NOTE — ED NOTES
I was asked to assist with this patient.  This patient was seen on 21 in the ER for AMS. Afull work up was done and the patient was able to discharge from the ER with his Brother In Law Kaleb who would care for him.  He was pulling on random car doors in a [employer] parking lot until EMS was called and he was brought here.  I called Kaleb and spoke with him about what happened.  This patient has no siblings (his sister --Kaleb's wife) and his parents have passed.  Kaleb is the nearest relative of this patient.  Kaleb said he was checking on him every other day and encouraged this patient to stay home.  He could go and get cigarettes but should otherwise stay home. Kaleb will come to the ER and help this patient. I explained to Kaleb that I can give him resources and we can talk further once he's here. Kaleb agreed. I updated the ER staff.    I received a call from Kaleb who stated he wanted this patient admitted to the hospital. I explained that this patient has no medical reason for hospital admission and that dementia is an outpatient work up.  Kaleb went on to explain the family h/o dementia in the family of sister, parents, g'parents. He said he was aware of the illness as he cared for his wife for 5 yrs with the illness. I encouraged Kaleb to get POA/HCA of this patient.  He said they have someone in line in the family to do this. I encouraged him to do this right away before it's too late.  Kaleb was speaking with a cousin as to how to proceed. In conversation I gave him resources and explained he should get this patient placed and then sell the house to pay for the placement.   I also offered for them to private pay for someone to stay with this patient.  Kaleb said he really needs someone to stay with him  which is why I wanted him to pursue a facility.  Kaleb did say that he has the time to stay with him. Kaleb said he will be on his way to get this patient.     Kaleb arrived and spoke with the ER provider.  Then I spoke with  Kaleb.  I gave him many resources and reviewed them with him. I answered all of his questions. I gave him the AVS paperwork from the 6/6/21 visit.  Kaleb agrees to take this patient home and move forward with placement.  I updated the bedside Rn that I have completed the consult.

## (undated) RX ORDER — FENTANYL CITRATE 50 UG/ML
INJECTION, SOLUTION INTRAMUSCULAR; INTRAVENOUS
Status: DISPENSED
Start: 2018-07-16